# Patient Record
Sex: MALE | Race: WHITE | Employment: FULL TIME | URBAN - METROPOLITAN AREA
[De-identification: names, ages, dates, MRNs, and addresses within clinical notes are randomized per-mention and may not be internally consistent; named-entity substitution may affect disease eponyms.]

---

## 2017-05-02 ENCOUNTER — TRANSFERRED RECORDS (OUTPATIENT)
Dept: HEALTH INFORMATION MANAGEMENT | Facility: CLINIC | Age: 37
End: 2017-05-02

## 2018-01-08 ENCOUNTER — TRANSFERRED RECORDS (OUTPATIENT)
Dept: HEALTH INFORMATION MANAGEMENT | Facility: CLINIC | Age: 38
End: 2018-01-08

## 2018-04-12 ENCOUNTER — TRANSFERRED RECORDS (OUTPATIENT)
Dept: HEALTH INFORMATION MANAGEMENT | Facility: CLINIC | Age: 38
End: 2018-04-12

## 2018-04-23 ENCOUNTER — TELEPHONE (OUTPATIENT)
Dept: OTHER | Facility: CLINIC | Age: 38
End: 2018-04-23

## 2018-04-23 NOTE — TELEPHONE ENCOUNTER
Referral received via fax.     Pt referred to [facility] by Dr. Jacinto of [facility] for lower leg pain and possible popliteal entrapment syndrome.     Pt had MRI and compartment testing with [facility].     Pt needs to be scheduled for consult with vascular surgery (referring provider requesting Dr. العلي).  Will route to scheduling to coordinate an appointment at next available.     ASHLEE ReynosoN, RN

## 2018-04-24 NOTE — TELEPHONE ENCOUNTER
Left message on home number for patient to call back to schedule consult appointment with Dr. العلي.

## 2018-05-10 NOTE — TELEPHONE ENCOUNTER
Progress notes and compartment testing, MRI results received from [facility] and in nurses office.     Images of MRI in [facility] site.     Lory Stewart, ASHLEEN, RN

## 2018-05-10 NOTE — TELEPHONE ENCOUNTER
Lb called back to schedule appointment with Dr. العلي.  He is scheduled on 05/17/18 with Dr. العلي.  Previous nurse note states patient had compartment testing and MRI at [facility].  I will route to nurse to see if she has notes/reports/images or if I need to request from [facility] for upcoming appt. Trice Villa,

## 2018-05-17 ENCOUNTER — OFFICE VISIT (OUTPATIENT)
Dept: OTHER | Facility: CLINIC | Age: 38
End: 2018-05-17
Attending: SURGERY
Payer: OTHER GOVERNMENT

## 2018-05-17 VITALS
BODY MASS INDEX: 29.82 KG/M2 | HEART RATE: 112 BPM | HEIGHT: 73 IN | WEIGHT: 225 LBS | DIASTOLIC BLOOD PRESSURE: 79 MMHG | SYSTOLIC BLOOD PRESSURE: 117 MMHG

## 2018-05-17 DIAGNOSIS — I77.89 POPLITEAL ARTERY ENTRAPMENT SYNDROME (H): Primary | ICD-10-CM

## 2018-05-17 PROCEDURE — 99203 OFFICE O/P NEW LOW 30 MIN: CPT | Mod: ZP | Performed by: SURGERY

## 2018-05-17 PROCEDURE — G0463 HOSPITAL OUTPT CLINIC VISIT: HCPCS

## 2018-05-17 NOTE — MR AVS SNAPSHOT
"              After Visit Summary   5/17/2018    Lb Ornelas    MRN: [id number]           Patient Information     Date Of Birth          1980        Visit Information        Provider Department      5/17/2018 2:00 PM Gordo العلي MD [facility]      Today's Diagnoses     Popliteal artery entrapment syndrome (H)    -  1       Follow-ups after your visit        Future tests that were ordered for you today     Open Future Orders        Priority Expected Expires Ordered    US DAVION Doppler with Exercise Bilateral Routine 5/17/2018 5/17/2019 5/17/2018    US Lower Extremity Arterial Duplex Bilateral Routine 5/17/2018 5/17/2019 5/17/2018    US Lower Extremity Venous Duplex Bilateral Routine 5/17/2018 5/17/2019 5/17/2018            Who to contact     If you have questions or need follow up information about today's clinic visit or your schedule please contact [facility] directly at [phone].  Normal or non-critical lab and imaging results will be communicated to you by [employer], letter or phone within 4 business days after the clinic has received the results. If you do not hear from us within 7 days, please contact the clinic through [employer] or phone. If you have a critical or abnormal lab result, we will notify you by phone as soon as possible.  Submit refill requests through [employer] or call your pharmacy and they will forward the refill request to us. Please allow 3 business days for your refill to be completed.          Additional Information About Your Visit        MyChart Information     [employer] lets you send messages to your doctor, view your test results, renew your prescriptions, schedule appointments and more. To sign up, go to [email or web address] . Click on \"Log in\" on the left side of the screen, which will take you to the Welcome page. Then click on \"Sign up Now\" on the right side of the page.     You " "will be asked to enter the access code listed below, as well as some personal information. Please follow the directions to create your username and password.     Your access code is: [email or web address]  Expires: 8/15/2018  2:42 PM     Your access code will  in 90 days. If you need help or a new code, please call your [place] clinic or [phone].        Care EveryWhere ID     This is your Care EveryWhere ID. This could be used by other organizations to access your [place] medical records  [id number]        Your Vitals Were     Pulse Height BMI (Body Mass Index)             112 6' 1\" (1.854 m) 29.69 kg/m2          Blood Pressure from Last 3 Encounters:   18 117/79    Weight from Last 3 Encounters:   18 225 lb (102.1 kg)              Today, you had the following     No orders found for display       Primary Care Provider Office Phone # Fax #    Venessa Zarate [phone]       [facility] [address] [facility] [address]        Equal Access to Services     [facility]: Hadii aad ku hadasho Soedgarali, waaxda luqadaha, qaybta kaalmada adebert, thanh quintero . So [facility] [phone].    ATENCIÓN: Si habla español, tiene a anderson disposición servicios gratuitos de asistencia lingüística. Brenda al [phone].    We comply with applicable federal civil rights laws and [place] laws. We do not discriminate on the basis of race, color, national origin, age, disability, sex, sexual orientation, or gender identity.            Thank you!     Thank you for choosing [facility] [place]  for your care. Our goal is always to provide you with excellent care. Hearing back from our patients is one way we can continue to improve our services. Please take a few minutes to complete the written survey that you may receive in the mail after your visit with us. Thank you!             Your Updated Medication List - Protect others around you: " Learn how to safely use, store and throw away your medicines at www.disposemymeds.org.      Notice  As of 5/17/2018  2:42 PM    You have not been prescribed any medications.

## 2018-05-17 NOTE — PROGRESS NOTES
[place] [facility]    Lb Ornelas to see me today per recommendation of Dr. Carver from [facility] and Dr. Jacinto.  This 37-year-old patient has complained of left greater than right pain associated with activities of began in 2015 not associated with any specific trauma.      Prior evaluation on 5/2/2017 with MRI healed no stress fracture or other pathology.    Underwent compartment pressures on 1/8/2018.  The right anterior compartment was 42 mmHg, left anterior compartment 64 mmHg, left deep posterior compartment 25 mmHg.  Dr. Carver did not feel that he had exercise-induced compartment syndrome.  He did not recommend surgical treatment with fasciotomies.    Patient is in [employer] and has not been able to run any long distances due to his leg discomfort.  He notices this usually between three-quarter and 1 mile distance.  His leg will be sore following this may last for several days.  Both legs are affected equally.  He reports the pain is more prominent of the distal anterior compartment some tingling and numbness at that area bilaterally.  He also experienced tightness of the posterior calf bilaterally.      He did have a stress fracture documented by MRI in 2015 that healed with time and cessation of running.      PMH: Medications: None.            No underlying medical problems.            No major surgical procedures.      He did smoke 1 cigarette daily for up to 10 years but quit in 2016.  Problems with alcohol use.    Cristopher: Very pleasant alert gentleman.  Blood pressure 146/94 right arm and 117/79 left arm.  Pulse between 89 and 112.  Normal affect.  BMI 29.7 kg/m .      Weight = 102.1 kg   Chest= clear.  Cardiovascular=RR  No lower leg swelling.  Normal sensation.  +3 posterior tibial pulses bilaterally  No calf tenderness.      .  Impression: Bilateral leg discomfort associated with running.  No evidence of the recent stress fracture that he had many years ago.  This certainly could be a  variant of popliteal artery compression syndrome.  I discussed the various muscles that can cause this problem including the medial head of the gastrocnemius muscle-plantaris muscle/tendon-soleus fascial band.  The numbness and tingling over the anterior distal compartment does not quite fit into this picture since this peroneal nerve has a very high takeoff and usually is not involved with any extrinsic compression.                             We will try to evaluate this further with popliteal entrapment including DAVION with exercise and dynamic duplex ultrasound of the popliteal arteries and veins to see if there is any signs of extrinsic compression.  I spent 20 minutes with the patient today with over 50% in counseling.  We will follow-up with him once the testing has been performed.      Gordo العلي MD     Please route or send letter to:  Dr Brandon Jacinto @ [facility]

## 2018-05-17 NOTE — NURSING NOTE
"Lb Ornelas is a 37 year old male who presents for:  Chief Complaint   Patient presents with     Consult     Pt referred to [facility] by Dr. Jacinto of [facility] for lower leg pain and possible popliteal entrapment syndrome. Pt had compartment testing and MRI at [facility].        Vitals:    Vitals:    05/17/18 1356 05/17/18 1357   BP: (!) 146/94 117/79   BP Location: Right arm Left arm   Patient Position: Chair Chair   Cuff Size: Adult Large Adult Large   Pulse: 89 112   Weight: 225 lb (102.1 kg)    Height: 6' 1\" (1.854 m)        BMI:  Estimated body mass index is 29.69 kg/(m^2) as calculated from the following:    Height as of this encounter: 6' 1\" (1.854 m).    Weight as of this encounter: 225 lb (102.1 kg).    Pain Score:  Data Unavailable            Madhuri Metz"

## 2018-05-17 NOTE — LETTER
[facility] [place] [address]  [place] [address]  Phone: [phone]  Fax: [phone]    May 17, 2018    Re: Lb Ornelas - 1980    Lb Ornelas to see me today per recommendation of Dr. Carver from [facility] and Dr. Jacinto. This 37-year-old patient has complained of left greater than right pain associated with activities of began in  not associated with any specific trauma.       Prior evaluation on 2017 with MRI healed no stress fracture or other pathology.     Underwent compartment pressures on 2018.  The right anterior compartment was 42 mmHg, left anterior compartment 64 mmHg, left deep posterior compartment 25 mmHg.  Dr. Carver did not feel that he had exercise-induced compartment syndrome.  He did not recommend surgical treatment with fasciotomies.     Patient is in [employer] and has not been able to run any long distances due to his leg discomfort.  He notices this usually between three-quarter and 1 mile distance.  His leg will be sore following this may last for several days.  Both legs are affected equally.  He reports the pain is more prominent of the distal anterior compartment some tingling and numbness at that area bilaterally.  He also experienced tightness of the posterior calf bilaterally.     He did have a stress fracture documented by MRI in  that healed with time and cessation of running.     PMH: Medications: None.            No underlying medical problems.            No major surgical procedures.     He did smoke 1 cigarette daily for up to 10 years but quit in .  Problems with alcohol use.     Cristopher: Very pleasant alert gentleman.  Blood pressure 146/94 right arm and 117/79 left arm. Pulse between 89 and 112.  Normal affect.  BMI 29.7 kg/m .      Weight = 102.1 kg   Chest= clear.  Cardiovascular=RR  No lower leg swelling.  Normal sensation.  +3 posterior tibial pulses bilaterally  No calf tenderness.     Impression:  Bilateral leg discomfort associated with running.  No evidence of the recent stress fracture that he had many years ago.  This certainly could be a variant of popliteal artery compression syndrome. I discussed the various muscles that can cause this problem including the medial head of the gastrocnemius muscle-plantaris muscle/tendon-soleus fascial band. The numbness and tingling over the anterior distal compartment does not quite fit into this picture since this peroneal nerve has a very high takeoff and usually is not involved with any extrinsic compression.     We will try to evaluate this further with popliteal entrapment including DAVION with exercise and dynamic duplex ultrasound of the popliteal arteries and veins to see if there is any signs of extrinsic compression. We will follow-up with him once the testing has been performed.        Gordo العلي MD

## 2018-05-30 ENCOUNTER — HOSPITAL ENCOUNTER (OUTPATIENT)
Dept: ULTRASOUND IMAGING | Facility: CLINIC | Age: 38
End: 2018-05-30
Attending: SURGERY
Payer: OTHER GOVERNMENT

## 2018-05-30 ENCOUNTER — HOSPITAL ENCOUNTER (OUTPATIENT)
Dept: ULTRASOUND IMAGING | Facility: CLINIC | Age: 38
Discharge: HOME | End: 2018-05-30
Attending: SURGERY | Admitting: SURGERY
Payer: OTHER GOVERNMENT

## 2018-05-30 DIAGNOSIS — I77.89 POPLITEAL ARTERY ENTRAPMENT SYNDROME (H): ICD-10-CM

## 2018-05-30 PROCEDURE — 93924 LWR XTR VASC STDY BILAT: CPT

## 2018-05-30 PROCEDURE — 93970 EXTREMITY STUDY: CPT

## 2018-05-30 PROCEDURE — 93925 LOWER EXTREMITY STUDY: CPT

## 2018-06-01 ENCOUNTER — TELEPHONE (OUTPATIENT)
Dept: OTHER | Facility: CLINIC | Age: 38
End: 2018-06-01

## 2018-06-01 DIAGNOSIS — I77.89 POPLITEAL ARTERY ENTRAPMENT SYNDROME (H): Primary | ICD-10-CM

## 2018-06-01 NOTE — TELEPHONE ENCOUNTER
[place] [facility]    I called Lb Ornelas about his noninvasive testing.  He has bilateral pain and tingling in his calf and feet associated with running.  This has not improved with conservative treatment.  He is in the [employer] and this prevents him from doing many of his training exercises.  His clinical history was consistent with popliteal artery entrapment syndrome.      We performed an DAVION with exercise.  This was 1.11 at rest in both of his legs with triphasic waveforms.  With running he developed tingling and pain with a decrease in the DAVION on the right to 0.83 in the left to 0.80 which is hemodynamically significant.  On his dynamic arterial duplex    The above and mid popliteal arteries were normal in diameter with very minimal compression.  However, it was a significant decrease with plantarflexion of the distal popliteal artery at the level of the  soleus fascia.  The right popliteal artery went from 6.8 mm of breast down to 2.2 mm with a marked increase in the velocities return is normal with dorsiflexion.  On the left this went from 7.1-2.4 mm again with an elevated velocity and returning to normal with dorsiflexion.          Venous exam revealed a smaller distal popliteal vein that showed evidence of compression at the soleus fascial level of the right and at the plantaris level on the left.        We discussed these findings and the phone with her 15 minute conversation today.  This testing would be indicative of compression of the neurovascular structures including the posterior tibial nerve by the plantaris muscle and also by the soleus proximal fascial band.  With the positive testing I would suspect that he should improve following the surgery though as we discussed at length today this is not certain.  I would recommend he undergo bilateral excision of the plantaris muscle/tendon and opening up of the proximal soleus fascia.  He is aware that this could cause some numbness  along the greater saphenous cutaneous nerve distribution due to traction though usually can avoid this nerve.  There also is some chance of scar tissue from the divided soleus fascia and we discussed the ways we help prevent this.  There is no specific restrictions to his activities following surgery though he would wear compression for a period time.  His activities would be limited by the discomfort only.  This would be performed under general anesthetic with usually a 1 day stay in the hospital.    I answered all of Lb's questions today.  He is interested in proceeding [facility] discectomy surgery.  He will have to work around his commitments this summer with his [employer] training commitments.        Gordo العلي MD

## 2018-06-01 NOTE — TELEPHONE ENCOUNTER
PRINCE Asher asking him to call me when he is ready to schedule the right and left plantaris muscle/tendon and soleus release when he is ready. Trice Villa,

## 2018-06-05 NOTE — TELEPHONE ENCOUNTER
L/m on Lb's cell that Dr العلي has opening midday this Fri 6/8/18, call back if interested. Jody Candelario -  at [facility]

## 2018-06-14 ENCOUNTER — TELEPHONE (OUTPATIENT)
Dept: OTHER | Facility: CLINIC | Age: 38
End: 2018-06-14

## 2018-06-14 NOTE — TELEPHONE ENCOUNTER
Lb called late yesterday to schedule surgery.  Type of surgery: RIGHT AND LEFT PLANTARIS MUSCLE, TENDON AND SOLEUS RELEASE  Location of surgery: [facility]  Date and time of surgery: 07/13/18 @ 7:30am  Surgeon: DR. SKY DENISE  Pre-Op Appt Date: PT TO SCHEDULE  Post-Op Appt Date: PT TO SCHEDULE   Packet sent out: Yes  Pre-cert/Authorization completed:  Yes  Date: 06/14/18

## 2018-07-12 ENCOUNTER — ANESTHESIA EVENT (OUTPATIENT)
Dept: SURGERY | Facility: CLINIC | Age: 38
End: 2018-07-12
Payer: OTHER GOVERNMENT

## 2018-07-12 ENCOUNTER — TELEPHONE (OUTPATIENT)
Dept: OTHER | Facility: CLINIC | Age: 38
End: 2018-07-12

## 2018-07-13 ENCOUNTER — ANESTHESIA (OUTPATIENT)
Dept: SURGERY | Facility: CLINIC | Age: 38
End: 2018-07-13
Payer: OTHER GOVERNMENT

## 2018-07-13 ENCOUNTER — OFFICE VISIT (OUTPATIENT)
Dept: SURGERY | Facility: PHYSICIAN GROUP | Age: 38
End: 2018-07-13
Payer: OTHER GOVERNMENT

## 2018-07-13 ENCOUNTER — HOSPITAL ENCOUNTER (OUTPATIENT)
Facility: CLINIC | Age: 38
Discharge: HOME | End: 2018-07-13
Attending: SURGERY | Admitting: SURGERY
Payer: OTHER GOVERNMENT

## 2018-07-13 ENCOUNTER — SURGERY (OUTPATIENT)
Age: 38
End: 2018-07-13

## 2018-07-13 VITALS
TEMPERATURE: 97.5 F | OXYGEN SATURATION: 94 % | SYSTOLIC BLOOD PRESSURE: 134 MMHG | HEIGHT: 73 IN | DIASTOLIC BLOOD PRESSURE: 81 MMHG | RESPIRATION RATE: 16 BRPM | WEIGHT: 230.5 LBS | BODY MASS INDEX: 30.55 KG/M2

## 2018-07-13 DIAGNOSIS — I77.89 POPLITEAL ARTERY ENTRAPMENT SYNDROME (H): Primary | ICD-10-CM

## 2018-07-13 LAB
ANION GAP SERPL CALCULATED.3IONS-SCNC: 9 MMOL/L (ref 3–14)
BUN SERPL-MCNC: 14 MG/DL (ref 7–30)
CALCIUM SERPL-MCNC: 8.9 MG/DL (ref 8.5–10.1)
CHLORIDE SERPL-SCNC: 108 MMOL/L (ref 94–109)
CO2 SERPL-SCNC: 24 MMOL/L (ref 20–32)
CREAT SERPL-MCNC: 1.26 MG/DL (ref 0.66–1.25)
GFR SERPL CREATININE-BSD FRML MDRD: 65 ML/MIN/1.7M2
GLUCOSE SERPL-MCNC: 96 MG/DL (ref 70–99)
POTASSIUM SERPL-SCNC: 3.8 MMOL/L (ref 3.4–5.3)
SODIUM SERPL-SCNC: 141 MMOL/L (ref 133–144)

## 2018-07-13 PROCEDURE — 25000128 H RX IP 250 OP 636: Performed by: SURGERY

## 2018-07-13 PROCEDURE — 37000008 ZZH ANESTHESIA TECHNICAL FEE, 1ST 30 MIN: Performed by: SURGERY

## 2018-07-13 PROCEDURE — 36000063 ZZH SURGERY LEVEL 4 EA 15 ADDTL MIN: Performed by: SURGERY

## 2018-07-13 PROCEDURE — 25000125 ZZHC RX 250: Performed by: NURSE ANESTHETIST, CERTIFIED REGISTERED

## 2018-07-13 PROCEDURE — 71000012 ZZH RECOVERY PHASE 1 LEVEL 1 FIRST HR: Performed by: SURGERY

## 2018-07-13 PROCEDURE — 80048 BASIC METABOLIC PNL TOTAL CA: CPT | Performed by: SURGERY

## 2018-07-13 PROCEDURE — 25000132 ZZH RX MED GY IP 250 OP 250 PS 637: Performed by: SURGERY

## 2018-07-13 PROCEDURE — 27210995 ZZH RX 272: Performed by: SURGERY

## 2018-07-13 PROCEDURE — 40000170 ZZH STATISTIC PRE-PROCEDURE ASSESSMENT II: Performed by: SURGERY

## 2018-07-13 PROCEDURE — 25000566 ZZH SEVOFLURANE, EA 15 MIN: Performed by: SURGERY

## 2018-07-13 PROCEDURE — 71000027 ZZH RECOVERY PHASE 2 EACH 15 MINS: Performed by: SURGERY

## 2018-07-13 PROCEDURE — 27687 REVISION OF CALF TENDON: CPT | Mod: 50 | Performed by: SURGERY

## 2018-07-13 PROCEDURE — 37000009 ZZH ANESTHESIA TECHNICAL FEE, EACH ADDTL 15 MIN: Performed by: SURGERY

## 2018-07-13 PROCEDURE — 27210794 ZZH OR GENERAL SUPPLY STERILE: Performed by: SURGERY

## 2018-07-13 PROCEDURE — 25000128 H RX IP 250 OP 636: Performed by: ANESTHESIOLOGY

## 2018-07-13 PROCEDURE — 25000125 ZZHC RX 250: Performed by: SURGERY

## 2018-07-13 PROCEDURE — 25000128 H RX IP 250 OP 636: Performed by: NURSE ANESTHETIST, CERTIFIED REGISTERED

## 2018-07-13 PROCEDURE — C1765 ADHESION BARRIER: HCPCS | Performed by: SURGERY

## 2018-07-13 PROCEDURE — 36000093 ZZH SURGERY LEVEL 4 1ST 30 MIN: Performed by: SURGERY

## 2018-07-13 PROCEDURE — 36415 COLL VENOUS BLD VENIPUNCTURE: CPT | Performed by: SURGERY

## 2018-07-13 RX ORDER — ONDANSETRON 2 MG/ML
4 INJECTION INTRAMUSCULAR; INTRAVENOUS EVERY 30 MIN PRN
Status: DISCONTINUED | OUTPATIENT
Start: 2018-07-13 | End: 2018-07-13 | Stop reason: HOSPADM

## 2018-07-13 RX ORDER — GLYCOPYRROLATE 0.2 MG/ML
INJECTION, SOLUTION INTRAMUSCULAR; INTRAVENOUS PRN
Status: DISCONTINUED | OUTPATIENT
Start: 2018-07-13 | End: 2018-07-13

## 2018-07-13 RX ORDER — OXYCODONE AND ACETAMINOPHEN 5; 325 MG/1; MG/1
1-2 TABLET ORAL EVERY 4 HOURS PRN
Qty: 15 TABLET | Refills: 0 | Status: ON HOLD | OUTPATIENT
Start: 2018-07-13 | End: 2019-02-15

## 2018-07-13 RX ORDER — BUPIVACAINE HYDROCHLORIDE 5 MG/ML
INJECTION, SOLUTION PERINEURAL PRN
Status: DISCONTINUED | OUTPATIENT
Start: 2018-07-13 | End: 2018-07-13 | Stop reason: HOSPADM

## 2018-07-13 RX ORDER — DEXAMETHASONE SODIUM PHOSPHATE 4 MG/ML
INJECTION, SOLUTION INTRA-ARTICULAR; INTRALESIONAL; INTRAMUSCULAR; INTRAVENOUS; SOFT TISSUE PRN
Status: DISCONTINUED | OUTPATIENT
Start: 2018-07-13 | End: 2018-07-13

## 2018-07-13 RX ORDER — LIDOCAINE HYDROCHLORIDE 20 MG/ML
INJECTION, SOLUTION INFILTRATION; PERINEURAL PRN
Status: DISCONTINUED | OUTPATIENT
Start: 2018-07-13 | End: 2018-07-13

## 2018-07-13 RX ORDER — KETOROLAC TROMETHAMINE 30 MG/ML
INJECTION, SOLUTION INTRAMUSCULAR; INTRAVENOUS PRN
Status: DISCONTINUED | OUTPATIENT
Start: 2018-07-13 | End: 2018-07-13

## 2018-07-13 RX ORDER — ONDANSETRON 2 MG/ML
INJECTION INTRAMUSCULAR; INTRAVENOUS PRN
Status: DISCONTINUED | OUTPATIENT
Start: 2018-07-13 | End: 2018-07-13

## 2018-07-13 RX ORDER — FENTANYL CITRATE 50 UG/ML
25-50 INJECTION, SOLUTION INTRAMUSCULAR; INTRAVENOUS
Status: DISCONTINUED | OUTPATIENT
Start: 2018-07-13 | End: 2018-07-13 | Stop reason: HOSPADM

## 2018-07-13 RX ORDER — CEFAZOLIN SODIUM 2 G/100ML
2 INJECTION, SOLUTION INTRAVENOUS
Status: COMPLETED | OUTPATIENT
Start: 2018-07-13 | End: 2018-07-13

## 2018-07-13 RX ORDER — PROPOFOL 10 MG/ML
INJECTION, EMULSION INTRAVENOUS PRN
Status: DISCONTINUED | OUTPATIENT
Start: 2018-07-13 | End: 2018-07-13

## 2018-07-13 RX ORDER — OXYCODONE AND ACETAMINOPHEN 5; 325 MG/1; MG/1
1 TABLET ORAL
Status: COMPLETED | OUTPATIENT
Start: 2018-07-13 | End: 2018-07-13

## 2018-07-13 RX ORDER — FENTANYL CITRATE 50 UG/ML
50-100 INJECTION, SOLUTION INTRAMUSCULAR; INTRAVENOUS
Status: DISCONTINUED | OUTPATIENT
Start: 2018-07-13 | End: 2018-07-13 | Stop reason: HOSPADM

## 2018-07-13 RX ORDER — HYDROMORPHONE HYDROCHLORIDE 1 MG/ML
.3-.5 INJECTION, SOLUTION INTRAMUSCULAR; INTRAVENOUS; SUBCUTANEOUS EVERY 10 MIN PRN
Status: DISCONTINUED | OUTPATIENT
Start: 2018-07-13 | End: 2018-07-13 | Stop reason: HOSPADM

## 2018-07-13 RX ORDER — MAGNESIUM HYDROXIDE 1200 MG/15ML
LIQUID ORAL PRN
Status: DISCONTINUED | OUTPATIENT
Start: 2018-07-13 | End: 2018-07-13 | Stop reason: HOSPADM

## 2018-07-13 RX ORDER — FENTANYL CITRATE 50 UG/ML
25-50 INJECTION, SOLUTION INTRAMUSCULAR; INTRAVENOUS EVERY 5 MIN PRN
Status: DISCONTINUED | OUTPATIENT
Start: 2018-07-13 | End: 2018-07-13 | Stop reason: HOSPADM

## 2018-07-13 RX ORDER — SODIUM CHLORIDE, SODIUM LACTATE, POTASSIUM CHLORIDE, CALCIUM CHLORIDE 600; 310; 30; 20 MG/100ML; MG/100ML; MG/100ML; MG/100ML
INJECTION, SOLUTION INTRAVENOUS CONTINUOUS
Status: DISCONTINUED | OUTPATIENT
Start: 2018-07-13 | End: 2018-07-13 | Stop reason: HOSPADM

## 2018-07-13 RX ORDER — FENTANYL CITRATE 50 UG/ML
INJECTION, SOLUTION INTRAMUSCULAR; INTRAVENOUS PRN
Status: DISCONTINUED | OUTPATIENT
Start: 2018-07-13 | End: 2018-07-13

## 2018-07-13 RX ORDER — SODIUM CHLORIDE, SODIUM LACTATE, POTASSIUM CHLORIDE, CALCIUM CHLORIDE 600; 310; 30; 20 MG/100ML; MG/100ML; MG/100ML; MG/100ML
INJECTION, SOLUTION INTRAVENOUS CONTINUOUS PRN
Status: DISCONTINUED | OUTPATIENT
Start: 2018-07-13 | End: 2018-07-13

## 2018-07-13 RX ORDER — ACETAMINOPHEN 500 MG
1000 TABLET ORAL ONCE
Status: COMPLETED | OUTPATIENT
Start: 2018-07-13 | End: 2018-07-13

## 2018-07-13 RX ORDER — MEPERIDINE HYDROCHLORIDE 25 MG/ML
12.5 INJECTION INTRAMUSCULAR; INTRAVENOUS; SUBCUTANEOUS
Status: DISCONTINUED | OUTPATIENT
Start: 2018-07-13 | End: 2018-07-13 | Stop reason: HOSPADM

## 2018-07-13 RX ORDER — ONDANSETRON 4 MG/1
4 TABLET, ORALLY DISINTEGRATING ORAL EVERY 30 MIN PRN
Status: DISCONTINUED | OUTPATIENT
Start: 2018-07-13 | End: 2018-07-13 | Stop reason: HOSPADM

## 2018-07-13 RX ORDER — NALOXONE HYDROCHLORIDE 0.4 MG/ML
.1-.4 INJECTION, SOLUTION INTRAMUSCULAR; INTRAVENOUS; SUBCUTANEOUS
Status: DISCONTINUED | OUTPATIENT
Start: 2018-07-13 | End: 2018-07-13 | Stop reason: HOSPADM

## 2018-07-13 RX ADMIN — ACETAMINOPHEN 1000 MG: 500 TABLET, FILM COATED ORAL at 07:04

## 2018-07-13 RX ADMIN — FENTANYL CITRATE 25 MCG: 50 INJECTION, SOLUTION INTRAMUSCULAR; INTRAVENOUS at 08:16

## 2018-07-13 RX ADMIN — CEFAZOLIN SODIUM 2 G: 2 INJECTION, SOLUTION INTRAVENOUS at 07:37

## 2018-07-13 RX ADMIN — SODIUM CHLORIDE, POTASSIUM CHLORIDE, SODIUM LACTATE AND CALCIUM CHLORIDE: 600; 310; 30; 20 INJECTION, SOLUTION INTRAVENOUS at 07:25

## 2018-07-13 RX ADMIN — FENTANYL CITRATE 25 MCG: 50 INJECTION, SOLUTION INTRAMUSCULAR; INTRAVENOUS at 09:12

## 2018-07-13 RX ADMIN — GLYCOPYRROLATE 0.2 MG: 0.2 INJECTION, SOLUTION INTRAMUSCULAR; INTRAVENOUS at 07:56

## 2018-07-13 RX ADMIN — FENTANYL CITRATE 50 MCG: 50 INJECTION, SOLUTION INTRAMUSCULAR; INTRAVENOUS at 07:48

## 2018-07-13 RX ADMIN — FENTANYL CITRATE 25 MCG: 50 INJECTION, SOLUTION INTRAMUSCULAR; INTRAVENOUS at 09:03

## 2018-07-13 RX ADMIN — FENTANYL CITRATE 25 MCG: 50 INJECTION, SOLUTION INTRAMUSCULAR; INTRAVENOUS at 08:37

## 2018-07-13 RX ADMIN — FENTANYL CITRATE 50 MCG: 50 INJECTION INTRAMUSCULAR; INTRAVENOUS at 09:22

## 2018-07-13 RX ADMIN — PROPOFOL 260 MG: 10 INJECTION, EMULSION INTRAVENOUS at 07:30

## 2018-07-13 RX ADMIN — FENTANYL CITRATE 100 MCG: 50 INJECTION, SOLUTION INTRAMUSCULAR; INTRAVENOUS at 07:30

## 2018-07-13 RX ADMIN — DEXAMETHASONE SODIUM PHOSPHATE 4 MG: 4 INJECTION, SOLUTION INTRA-ARTICULAR; INTRALESIONAL; INTRAMUSCULAR; INTRAVENOUS; SOFT TISSUE at 07:39

## 2018-07-13 RX ADMIN — SODIUM CHLORIDE 1000 ML: 900 IRRIGANT IRRIGATION at 08:04

## 2018-07-13 RX ADMIN — MIDAZOLAM 2 MG: 1 INJECTION INTRAMUSCULAR; INTRAVENOUS at 07:27

## 2018-07-13 RX ADMIN — OXYCODONE HYDROCHLORIDE AND ACETAMINOPHEN 1 TABLET: 5; 325 TABLET ORAL at 09:53

## 2018-07-13 RX ADMIN — KETOROLAC TROMETHAMINE 30 MG: 30 INJECTION, SOLUTION INTRAMUSCULAR at 08:55

## 2018-07-13 RX ADMIN — BUPIVACAINE HYDROCHLORIDE 30 ML: 5 INJECTION, SOLUTION PERINEURAL at 08:57

## 2018-07-13 RX ADMIN — ONDANSETRON 4 MG: 2 INJECTION INTRAMUSCULAR; INTRAVENOUS at 08:55

## 2018-07-13 RX ADMIN — LIDOCAINE HYDROCHLORIDE 100 MG: 20 INJECTION, SOLUTION INFILTRATION; PERINEURAL at 07:30

## 2018-07-13 ASSESSMENT — COPD QUESTIONNAIRES: COPD: 0

## 2018-07-13 ASSESSMENT — ENCOUNTER SYMPTOMS
ORTHOPNEA: 0
DYSRHYTHMIAS: 0
SEIZURES: 0

## 2018-07-13 ASSESSMENT — LIFESTYLE VARIABLES: TOBACCO_USE: 0

## 2018-07-13 NOTE — BRIEF OP NOTE
[facility] Brief Operative Note    Pre-operative diagnosis: POPLITEAL ENTRAPMENT SYNDROME   Post-operative diagnosis Same as above   Procedure: Procedure(s):  RIGHT AND LEFT PLANTARIS MUSCLE, TENDON, AND SOLEUS RELEASE - Wound Class: I-Clean   Surgeon(s): Surgeon(s) and Role:     * Gordo العلي MD - Primary     * Dinh Castrejon MD - Assisting   Estimated blood loss: 2 mL    Specimens: * No specimens in log *   Findings: Plantaris muscle division and soleus fascia release done bilaterally until the popliteal artery, tibioperoneal trunk was confirmed to be freely coursing down the deep posterior compartment.     Aubrie Washburn DO PGY 4  Department of Surgery

## 2018-07-13 NOTE — ANESTHESIA CARE TRANSFER NOTE
Patient: Lb Ornelas    Procedure(s):  RIGHT AND LEFT PLANTARIS MUSCLE, TENDON, AND SOLEUS RELEASE - Wound Class: I-Clean    Diagnosis: POPLITEAL ENTRAPMENT SYNDROME  Diagnosis Additional Information: No value filed.    Anesthesia Type:   General, LMA     Note:  Airway :Face Mask  Patient transferred to:PACU  Handoff Report: Identifed the Patient, Identified the Reponsible Provider, Reviewed the pertinent medical history, Discussed the surgical course, Reviewed Intra-OP anesthesia mangement and issues during anesthesia, Set expectations for post-procedure period and Allowed opportunity for questions and acknowledgement of understanding      Vitals: (Last set prior to Anesthesia Care Transfer)    CRNA VITALS  7/13/2018 0844 - 7/13/2018 0919      7/13/2018             Pulse: 111    SpO2: 99 %    Resp Rate (set): 10                Electronically Signed By: Naldo Ramirez  July 13, 2018  9:19 AM

## 2018-07-13 NOTE — ANESTHESIA PREPROCEDURE EVALUATION
Procedure: Procedure(s):  RELEASE POPLITEAL ENTRAPMENT  Preop diagnosis: POPLITEAL ENTRAPMENT SYNDROME    Allergies   Allergen Reactions     No Known Allergies      Past Medical History:   Diagnosis Date     Popliteal artery entrapment syndrome (H)      Stress fracture     HX of bilat knees     Past Surgical History:   Procedure Laterality Date     wisdom teeth       Social History   Substance Use Topics     Smoking status: Former Smoker     Smokeless tobacco: Current User     Alcohol use Yes     Prior to Admission medications    Not on File     No current Epic-ordered facility-administered medications on file.      No current Epic-ordered outpatient prescriptions on file.       Wt Readings from Last 1 Encounters:   05/17/18 102.1 kg (225 lb)     Temp Readings from Last 1 Encounters:   No data found for Temp     BP Readings from Last 6 Encounters:   05/17/18 117/79     Pulse Readings from Last 4 Encounters:   05/17/18 112     Resp Readings from Last 1 Encounters:   No data found for Resp     SpO2 Readings from Last 1 Encounters:   No data found for SpO2     RECENT LABS:   HGB, Plts WNL     Anesthesia Evaluation     . Pt has had prior anesthetic. Type: MAC    No history of anesthetic complications          ROS/MED HX    ENT/Pulmonary:      (-) tobacco use, asthma, COPD, sleep apnea and recent URI   Neurologic:      (-) seizures, CVA and TIA   Cardiovascular: Comment: Popliteal Artery Entrapment Syndrome        (-) angina, hypertension, CAD, orthopnea/PND, syncope, arrhythmias, irregular heartbeat/palpitations, valvular problems/murmurs and angina   METS/Exercise Tolerance:  >4 METS   Hematologic:        (-) anemia   Musculoskeletal:         GI/Hepatic:     (+) GERD (occasional - none currently) Other,      (-) liver disease   Renal/Genitourinary:      (-) renal disease   Endo:      (-) Type II DM, thyroid disease and chronic steroid usage   Psychiatric:         Infectious Disease:        (-) Recent Fever    Malignancy:         Other:                     Physical Exam  Normal systems: dental    Airway   Mallampati: II  TM distance: >3 FB  Neck ROM: full    Dental     Cardiovascular   Rhythm and rate: regular and normal  (-) no murmur    Pulmonary    breath sounds clear to auscultation(-) no rhonchi and no wheezes                    Anesthesia Plan      History & Physical Review  History and physical reviewed and following examination; no interval change.    ASA Status:  1 .    NPO Status:  > 8 hours    Plan for General and LMA with Intravenous and Propofol induction. Maintenance will be Balanced.    PONV prophylaxis:  Ondansetron (or other 5HT-3) and Dexamethasone or Solumedrol       Postoperative Care  Postoperative pain management:  Multi-modal analgesia.      Consents  Anesthetic plan, risks, benefits and alternatives discussed with:  Patient..

## 2018-07-13 NOTE — PROGRESS NOTES
Admission medication history interview status for the 7/13/2018  admission is complete. See EPIC admission navigator for prior to admission medications     Medication history source reliability:Good    Medication history interview source(s):Patient    Medication history resources (including written lists, pill bottles, clinic record):None    Primary pharmacy.N/A    Additional medication history information not noted on PTA med list :None    Time spent in this activity: 30 minutes    Prior to Admission medications    Not on File

## 2018-07-13 NOTE — IP AVS SNAPSHOT
[place] [address]    [place] [address]    Phone:  [phone]                                       After Visit Summary   7/13/2018    Lb Ornelas    MRN: [id number]           After Visit Summary Signature Page     I have received my discharge instructions, and my questions have been answered. I have discussed any challenges I see with this plan with the nurse or doctor.    ..........................................................................................................................................  Patient/Patient Representative Signature      ..........................................................................................................................................  Patient Representative Print Name and Relationship to Patient    ..................................................               ................................................  Date                                            Time    ..........................................................................................................................................  Reviewed by Signature/Title    ...................................................              ..............................................  Date                                                            Time

## 2018-07-13 NOTE — IP AVS SNAPSHOT
MRN:[id number]                      After Visit Summary   7/13/2018    Lb Ornelas    MRN: [id number]           Thank you!     Thank you for choosing [facility] for your care. Our goal is always to provide you with excellent care. Hearing back from our patients is one way we can continue to improve our services. Please take a few minutes to complete the written survey that you may receive in the mail after you visit with us. Thank you!        Patient Information     Date Of Birth          1980        Designated Caregiver       Most Recent Value    Caregiver    Will someone help with your care after discharge? yes    Name of designated caregiver Nichelle Ornelas    Phone number of caregiver [phone]    Caregiver address [address] [place] [facility] [address]      About your hospital stay     You were admitted on:  July 13, 2018 [facility]    You were discharged on:  July 13, 2018       Who to Call     For medical emergencies, please call 911.  For non-urgent questions about your medical care, please call your primary care provider or clinic, [phone]  For questions related to your surgery, please call your surgery clinic        Attending Provider     Provider Gordo Duque MD Surgery       Primary Care Provider Office Phone # Fax #    Venessa Zarate [phone]      After Care Instructions     Diet Instructions       Resume pre-procedure diet            Discharge Instructions       Patient to follow up with appointment in 2 weeks with Dr. العلي.            Discharge Instructions       Follow up appointment as instructed by Surgeon and or RN            Dressing       Keep dressing clean and dry.  Dressing / incisional care:  Remove dressing 48 hours after surgery  On POD #2.  The steri strips protecting your incisions will fall off on their own.  You can shower and let water run over your incisions, do not soak or  scrub.   No sutures need to be removed.            No Alcohol       For 24 hours post procedure            No driving or operating machinery        until the day after procedure            Shower       No shower for 48 hours post procedure. May shower Postoperative Day (POD)  2            Weight bearing status - As tolerated                 Further instructions from your care team       Same Day Surgery Discharge Instructions for  Sedation and General Anesthesia       It's not unusual to feel dizzy, light-headed or faint for up to 24 hours after surgery or while taking pain medication.  If you have these symptoms: sit for a few minutes before standing and have someone assist you when you get up to walk or use the bathroom.      You should rest and relax for the next 24 hours. We recommend you make arrangements to have an adult stay with you for at least 24 hours after your discharge.  Avoid hazardous and strenuous activity.      DO NOT DRIVE any vehicle or operate mechanical equipment for 24 hours following the end of your surgery.  Even though you may feel normal, your reactions may be affected by the medication you have received.      Do not drink alcoholic beverages for 24 hours following surgery.       Slowly progress to your regular diet as you feel able. It's not unusual to feel nauseated and/or vomit after receiving anesthesia.  If you develop these symptoms, drink clear liquids (apple juice, ginger ale, broth, 7-up, etc. ) until you feel better.  If your nausea and vomiting persists for 24 hours, please notify your surgeon.        All narcotic pain medications, along with inactivity and anesthesia, can cause constipation. Drinking plenty of liquids and increasing fiber intake will help.      For any questions of a medical nature, call your surgeon.      Do not make important decisions for 24 hours.      If you had general anesthesia, you may have a sore throat for a couple of days related to the breathing  "tube used during surgery.  You may use Cepacol lozenges to help with this discomfort.  If it worsens or if you develop a fever, contact your surgeon.       If you feel your pain is not well managed with the pain medications prescribed by your surgeon, please contact your surgeon's office to let them know so they can address your concerns.       Today you were given 1000 mg of Tylenol at 0700. The recommended daily maximum dose is 4000 mg.     Today you received Toradol, an antiinflammatory medication similar to Ibuprofen.  You should not take other antiinflammatory medication, such as Ibuprofen, Motrin, Advil, Aleve, Naprosyn, etc, until 3 p.m.     Reasons to contact your surgeon:    1. Signs of possible infection: Check your incision daily for redness, swelling, warmth, red streaks or foul drainage.   2. Elevated temperature.  3. Pain not controlled with pain medication and/or rest.   4. Uncontrolled nausea or vomiting.  5. Any questions or concerns.      **If you have questions or concerns about your procedure  call Dr Gordo العلي at [phone]**    Pending Results     No orders found from 7/11/2018 to 7/14/2018.            Admission Information     Date & Time Provider Department Dept. Phone    7/13/2018 Gordo العلي MD [facility] PACU [phone]      Your Vitals Were     Blood Pressure Temperature Respirations Height Weight Pulse Oximetry    149/75 97.5  F (36.4  C) 16 1.854 m (6' 1\") 104.6 kg (230 lb 8 oz) 95%    BMI (Body Mass Index)                   30.41 kg/m2           [email or web address] Information     [email or web address] lets you send messages to your doctor, view your test results, renew your prescriptions, schedule appointments and more. To sign up, go to [email or web address] . Click on \"Log in\" on the left side of the screen, which will take you to the Welcome page. Then click on \"Sign up Now\" on the right side of the page.     You will be asked to enter the access code listed below, as well as " some personal information. Please follow the directions to create your username and password.     Your access code is: [email or web address]  Expires: 8/15/2018  2:42 PM     Your access code will  in 90 days. If you need help or a new code, please call your [facility] clinic or [phone].        Care EveryWhere ID     This is your Care EveryWhere ID. This could be used by other organizations to access your [facility] medical records  [id number]        Equal Access to Services     OMER HORTA : Hadii cassie ku hadasho Soomaali, waaxda luqadaha, qaybta kaalmada adeegyada, waxrenetta rogersin haymaikel quintero . So [facility] [place] [phone].    ATENCIÓN: Si habla español, tiene a anderson disposición servicios gratuitos de asistencia lingüística. Llame al [phone].    We comply with applicable federal civil rights laws and [place] laws. We do not discriminate on the basis of race, color, national origin, age, disability, sex, sexual orientation, or gender identity.               Review of your medicines      START taking        Dose / Directions    oxyCODONE-acetaminophen 5-325 MG per tablet   Commonly known as:  PERCOCET   Notes to Patient:  ONE TABLET TAKEN AT 9:53 A.M.        Dose:  1-2 tablet   Take 1-2 tablets by mouth every 4 hours as needed for pain (moderate to severe)   Quantity:  15 tablet   Refills:  0            Where to get your medicines      Some of these will need a paper prescription and others can be bought over the counter. Ask your nurse if you have questions.     Bring a paper prescription for each of these medications     oxyCODONE-acetaminophen 5-325 MG per tablet                Protect others around you: Learn how to safely use, store and throw away your medicines at www.disposemymeds.org.        Information about OPIOIDS     PRESCRIPTION OPIOIDS: WHAT YOU NEED TO KNOW   We gave you an opioid (narcotic) pain medicine. It is important to manage your pain, but opioids are not always the best choice. You  should first try all the other options your care team gave you. Take this medicine for as short a time (and as few doses) as possible.     These medicines have risks:    DO NOT drive when on new or higher doses of pain medicine. These medicines can affect your alertness and reaction times, and you could be arrested for driving under the influence (DUI). If you need to use opioids long-term, talk to your care team about driving.    DO NOT operate heave machinery    DO NOT do any other dangerous activities while taking these medicines.     DO NOT drink any alcohol while taking these medicines.      If the opioid prescribed includes acetaminophen, DO NOT take with any other medicines that contain acetaminophen. Read all labels carefully. Look for the word  acetaminophen  or  Tylenol.  Ask your pharmacist if you have questions or are unsure.    You can get addicted to pain medicines, especially if you have a history of addiction (chemical, alcohol or substance dependence). Talk to your care team about ways to reduce this risk.    Store your pills in a secure place, locked if possible. We will not replace any lost or stolen medicine. If you don t finish your medicine, please throw away (dispose) as directed by your pharmacist. The [place] Pollution Control Agency has more information about safe disposal: https://www.pca.[facility].mn.us/living-green/managing-unwanted-medications.     All opioids tend to cause constipation. Drink plenty of water and eat foods that have a lot of fiber, such as fruits, vegetables, prune juice, apple juice and high-fiber cereal. Take a laxative (Miralax, milk of magnesia, Colace, Senna) if you don t move your bowels at least every other day.              Medication List: This is a list of all your medications and when to take them. Check marks below indicate your daily home schedule. Keep this list as a reference.      Medications           Morning Afternoon Evening Bedtime As Needed     oxyCODONE-acetaminophen 5-325 MG per tablet   Commonly known as:  PERCOCET   Take 1-2 tablets by mouth every 4 hours as needed for pain (moderate to severe)   Last time this was given:  1 tablet on 7/13/2018  9:53 AM   Notes to Patient:  ONE TABLET TAKEN AT 9:53 A.M.

## 2018-07-13 NOTE — ANESTHESIA POSTPROCEDURE EVALUATION
Patient: Lb Potter    Procedure(s):  RIGHT AND LEFT PLANTARIS MUSCLE, TENDON, AND SOLEUS RELEASE - Wound Class: I-Clean    Diagnosis:POPLITEAL ENTRAPMENT SYNDROME  Diagnosis Additional Information: No value filed.    Anesthesia Type:  General, LMA    Note:  Anesthesia Post Evaluation    Patient location during evaluation: PACU  Patient participation: Able to fully participate in evaluation  Level of consciousness: awake and alert  Pain management: adequate  Airway patency: patent  Cardiovascular status: acceptable and hemodynamically stable  Respiratory status: nonlabored ventilation, unassisted and acceptable  Hydration status: acceptable  PONV: none             Last vitals:  Vitals:    07/13/18 1000 07/13/18 1015 07/13/18 1042   BP: 130/81 137/82 134/81   Resp: 14 12 16   Temp: 36.3  C (97.3  F) 36.4  C (97.5  F)    SpO2: 93% 94% 94%         Electronically Signed By: Jack Saldivar MD  July 13, 2018  6:15 PM

## 2018-07-13 NOTE — OP NOTE
Procedure Date: 07/13/2018      PREOPERATIVE DIAGNOSIS:  Bilateral symptomatic popliteal artery entrapment syndrome.      POSTOPERATIVE DIAGNOSIS:  Bilateral symptomatic popliteal artery entrapment syndrome.      PROCEDURES:   1.  Division/excision left plantaris muscle.   a.  Division of proximal left soleus fascial band.   b.  Mobilization neurovascular structures.   2.  Division/excision right plantaris muscle and tendon.   a.  Division right soleus fascial band.   b.  Mobilization neurovascular structures.      SURGEON:  Gordo العلي MD      ASSISTANT:  Dinh Castrejon MD, (Vascular Fellow)      :  Aubrie Washburn [place] [place]      ANESTHESIA:  General.      PREOPERATIVE MEDICATIONS:  Ancef 2 grams, Tylenol 1000 mg orally.      INDICATIONS:  A 37-year-old patient in the [employer], has had increasing problems with running to a point he can barely do this which affects his job.  He has undergone noninvasive testing which was positive for evidence of popliteal artery entrapment syndrome most likely from the plantaris muscle and soleus fascia.  He has failed all conservative treatment.  He comes to the operating today under informed consent.  Risks and benefits were discussed with the patient and his wife preoperatively.      PROCEDURE:  The patient was brought to the operating room, induced under general anesthesia.  LMA was placed.  Both legs were prepped and draped in sterile fashion.  He was very muscular, but not obese.  Timeout was called and the sites were identified.      Division/excision left plantaris muscle and tendon:  A 7-cm incision was made in the left proximal medial calf.  Dissection was carried down to the fascia avoiding the greater saphenous vein and nerve.  The fascia was divided avoiding injury to the semitendinosis tendon.  Gastrocnemius muscles was mobilized and retracted posteriorly.  We easily identified the plantaris tendon, this was clamped.  This was mobilized  distally and divided.  With the aid of loupe magnification and light source and deep retractors, we then mobilized the plantaris muscle until we were well lateral to the neurovascular structures.  This was then divided with electrocautery with excellent hemostasis and removed.  The remaining muscle retracted well lateral to the neurovascular structures with no encroachment.      Division left soleus fascia:  We then directed our attention to the left of the soleus fascial band.  We identified the neurovascular structures going into the deep posterior compartment.  This was a very tight opening that would not come close to admitting a digit.  Under direct visualization, we divided the fascial attachments of the proximal psoas for a length of 5 cm until we could easily pass a digit in the deep posterior compartment.  We then proceeded to cautiously mobilize the neurovascular structures, but made sure there were no structures medially attached to this.  We looked cephalad and again there was no obvious encroachment by the medial head of the gastrocnemius muscle.  Excellent hemostasis was noted. A small amount of Seprafilm was placed over the divided muscle and fascia to prevent postoperative adhesions.      Excision/division right plantaris muscle and tendon:  We then addressed our attention to the right leg.  A similar 7-cm incision was made.  Dissection was carried down to the fascia, again avoiding the greater saphenous vein and nerve.  The fascia was divided.  Gastrocnemius muscles retracted posteriorly.  The plantaris tendon was identified and clamped.  This was dissected free distally for approximately 6 cm and transected.  Again, with loupe magnification - light source and deep retractors, we mobilized the muscular head of the plantaris muscle which was of moderate size to we were well lateral to the neurovascular structures where it was divided with electrocautery with excellent hemostasis and removed.       Division right soleus fascia:  Again, there was a very tight opening to the deep posterior compartment.  Using a very similar technique, we divided with electrocautery to the soleus fascia for a length of 5 cm.  There was a crossing veins in the muscle, which was divided between 3-0 silk suture with excellent hemostasis.  Neurovascular structures were again mobilized to make sure there was no encroachment both proximally and distally.  A small amount of Seprafilm was placed over the divided muscle and fascia to prevent adhesions to the brachial plexus     The superficial fascia was closed with interrupted 3-0 Vicryl bilaterally.  Subcutaneous tissue was closed with interrupted 3-0 Vicryl and skin was closed with 4-0 Monocryl in subcutaneous fashion.  Wounds were infiltrated with 0.5% Marcaine post analgesia along with Toradol 30 mg IV.  Steri-Strips, gauze, cast rolls were applied, followed by thigh-high NICOLÁS stockings.      The patient tolerated the procedure well.      ESTIMATED BLOOD LOSS:  2 mL.      COMPLICATIONS:  None.      The patient will be discharged to home from recovery.  He is instructed in his postoperative cares and activities.         SKY DENISE MD             D: 2018   T: 2018   MT: MELLISSA      Name:     YULISA ELLER   MRN:      [id number]        Account:        [id number]   :      1980           Procedure Date: 2018      Document: [id number]

## 2018-07-13 NOTE — ANESTHESIA CARE TRANSFER NOTE
Patient: Lb Ornelas    Procedure(s):  RIGHT AND LEFT PLANTARIS MUSCLE, TENDON, AND SOLEUS RELEASE - Wound Class: I-Clean    Diagnosis: POPLITEAL ENTRAPMENT SYNDROME  Diagnosis Additional Information: No value filed.    Anesthesia Type:   General, LMA     Note:  Airway :Face Mask  Patient transferred to:PACU  Handoff Report: Identifed the Patient, Identified the Reponsible Provider, Reviewed the pertinent medical history, Discussed the surgical course, Reviewed Intra-OP anesthesia mangement and issues during anesthesia, Set expectations for post-procedure period and Allowed opportunity for questions and acknowledgement of understanding      Vitals: (Last set prior to Anesthesia Care Transfer)    CRNA VITALS  7/13/2018 0844 - 7/13/2018 0920      7/13/2018             Pulse: 111    SpO2: 99 %    Resp Rate (set): 10                Electronically Signed By: DIANA Lange CRNA  July 13, 2018  9:20 AM

## 2018-07-13 NOTE — DISCHARGE INSTRUCTIONS
Same Day Surgery Discharge Instructions for  Sedation and General Anesthesia       It's not unusual to feel dizzy, light-headed or faint for up to 24 hours after surgery or while taking pain medication.  If you have these symptoms: sit for a few minutes before standing and have someone assist you when you get up to walk or use the bathroom.      You should rest and relax for the next 24 hours. We recommend you make arrangements to have an adult stay with you for at least 24 hours after your discharge.  Avoid hazardous and strenuous activity.      DO NOT DRIVE any vehicle or operate mechanical equipment for 24 hours following the end of your surgery.  Even though you may feel normal, your reactions may be affected by the medication you have received.      Do not drink alcoholic beverages for 24 hours following surgery.       Slowly progress to your regular diet as you feel able. It's not unusual to feel nauseated and/or vomit after receiving anesthesia.  If you develop these symptoms, drink clear liquids (apple juice, ginger ale, broth, 7-up, etc. ) until you feel better.  If your nausea and vomiting persists for 24 hours, please notify your surgeon.        All narcotic pain medications, along with inactivity and anesthesia, can cause constipation. Drinking plenty of liquids and increasing fiber intake will help.      For any questions of a medical nature, call your surgeon.      Do not make important decisions for 24 hours.      If you had general anesthesia, you may have a sore throat for a couple of days related to the breathing tube used during surgery.  You may use Cepacol lozenges to help with this discomfort.  If it worsens or if you develop a fever, contact your surgeon.       If you feel your pain is not well managed with the pain medications prescribed by your surgeon, please contact your surgeon's office to let them know so they can address your concerns.       Today you were given 1000 mg of Tylenol at  0700. The recommended daily maximum dose is 4000 mg.     Today you received Toradol, an antiinflammatory medication similar to Ibuprofen.  You should not take other antiinflammatory medication, such as Ibuprofen, Motrin, Advil, Aleve, Naprosyn, etc, until 3 p.m.     Reasons to contact your surgeon:    1. Signs of possible infection: Check your incision daily for redness, swelling, warmth, red streaks or foul drainage.   2. Elevated temperature.  3. Pain not controlled with pain medication and/or rest.   4. Uncontrolled nausea or vomiting.  5. Any questions or concerns.      **If you have questions or concerns about your procedure  call Dr Gordo العلي at [phone]**

## 2018-07-16 ENCOUNTER — TELEPHONE (OUTPATIENT)
Dept: OTHER | Facility: CLINIC | Age: 38
End: 2018-07-16

## 2018-07-16 NOTE — TELEPHONE ENCOUNTER
[place] [facility]    I called Lb Ornelas after his PAES surgery on 7/13/2018 as an outpatient.  He has had some soreness as expected postoperative with the right being somewhat more than the left.  He does notice some tingling sensation as he rests his calf down.  He is wearing his compression stockings with no specific swelling.    He has been able to walk around his home.    Patient is in the [employer].  He will be bringing forms to the office to sign about when he will be able to run again which is the arm is requirement.  This may be very difficult to determine at this time and we may tentatively report that will be approximately 3 months though hopefully sooner than that.    Gordo العيل MD

## 2018-07-26 ENCOUNTER — OFFICE VISIT (OUTPATIENT)
Dept: OTHER | Facility: CLINIC | Age: 38
End: 2018-07-26
Attending: SURGERY
Payer: OTHER GOVERNMENT

## 2018-07-26 VITALS — DIASTOLIC BLOOD PRESSURE: 88 MMHG | HEART RATE: 78 BPM | SYSTOLIC BLOOD PRESSURE: 126 MMHG

## 2018-07-26 DIAGNOSIS — I77.89 POPLITEAL ARTERY ENTRAPMENT SYNDROME (H): Primary | ICD-10-CM

## 2018-07-26 PROCEDURE — G0463 HOSPITAL OUTPT CLINIC VISIT: HCPCS

## 2018-07-26 PROCEDURE — 99024 POSTOP FOLLOW-UP VISIT: CPT | Mod: ZP | Performed by: SURGERY

## 2018-07-26 NOTE — NURSING NOTE
"Lb Ornelas is a 37 year old male who presents for:  Chief Complaint   Patient presents with     RECHECK     1st PO; RIGHT AND LEFT PLANTARIS MUSCLE, TENDON AND SOLEUS RELEASE        Vitals:    Vitals:    07/26/18 1616   BP: 126/88   BP Location: Left arm   Patient Position: Chair   Cuff Size: Adult Large   Pulse: 78       BMI:  Estimated body mass index is 30.41 kg/(m^2) as calculated from the following:    Height as of 7/13/18: 6' 1\" (1.854 m).    Weight as of 7/13/18: 230 lb 8 oz (104.6 kg).    Pain Score:  Data Unavailable        Madhuri Metz"

## 2018-07-26 NOTE — PROGRESS NOTES
Returns for follow-up of his [facility]    Lb Ornelas excision of the plantaris muscle and tendon along with release of the soleus proximal fashion performed on 7/13/2018.  Patient was in the [employer] his job requires that he is able to run which he had been unable to do even with extensive conservative treatment and physical therapy.  He went home on the evening of the surgery and was doing well when I called him on 7/16/2018.    Overall he is done very well following surgery.  He has some intermittent numbness along the distribution of the greater saphenous nerve due to traction that should improve.  He has had no swelling.  Surgical incisions are healing well.    Patient is in the [place] [employer].  His job requires lifting and carrying and running and wearing his uniform.  He started doing some running but despite his legs feeling better than before surgery they are still somewhat weak from his long-term issues.  We expect this will gradually improve with time.    He is going up to [facility] for a two-week training session.  I filled out the [employer]'s disability form.  He basically may do all activities but the kind events such as swimming and running 2 miles or more are not appropriate at this time since he still recovering from surgery.  We have listed this is of permanent disability but this means and the [employer] that this can be reevaluated and changed accordingly after he is recovered from the surgery.  Thus we will plan to see him again in 2 months for reevaluation.  There are no specific weight restrictions just the restrictions on time activities.  He may run if he is able and goes longer distances he is able to do with no harm to the surgical site.    It is difficult to determine whether will have complete recovery which is our hope and how long to he gets to that point.  This was discussed prior to surgery and again reviewed today.      Gordo العلي MD      Please route or send letter to:  Primary Care Provider (PCP)

## 2018-07-26 NOTE — MR AVS SNAPSHOT
"              After Visit Summary   2018    Lb Ornelas    MRN: [id number]           Patient Information     Date Of Birth          1980        Visit Information        Provider Department      2018 4:15 PM Gordo العلي MD [facility] at  [facility] [place]      Today's Diagnoses     Popliteal artery entrapment syndrome (H)    -  1       Follow-ups after your visit        Follow-up notes from your care team     Return in about 2 months (around 2018).      Who to contact     If you have questions or need follow up information about today's clinic visit or your schedule please contact [facility] directly at [phone].  Normal or non-critical lab and imaging results will be communicated to you by MyChart, letter or phone within 4 business days after the clinic has received the results. If you do not hear from us within 7 days, please contact the clinic through MyChart or phone. If you have a critical or abnormal lab result, we will notify you by phone as soon as possible.  Submit refill requests through [employer] or call your pharmacy and they will forward the refill request to us. Please allow 3 business days for your refill to be completed.          Additional Information About Your Visit        MyChart Information     [employer] lets you send messages to your doctor, view your test results, renew your prescriptions, schedule appointments and more. To sign up, go to [email or web address] . Click on \"Log in\" on the left side of the screen, which will take you to the Welcome page. Then click on \"Sign up Now\" on the right side of the page.     You will be asked to enter the access code listed below, as well as some personal information. Please follow the directions to create your username and password.     Your access code is: [email or web address]  Expires: 10/24/2018  4:01 PM     Your access code will  in 90 days. If you " need help or a new code, please call your [facility] clinic or [phone].        Care EveryWhere ID     This is your Care EveryWhere ID. This could be used by other organizations to access your [facility] medical records  [id number]        Your Vitals Were     Pulse                   78            Blood Pressure from Last 3 Encounters:   07/26/18 126/88   07/13/18 134/81   05/17/18 117/79    Weight from Last 3 Encounters:   07/13/18 230 lb 8 oz (104.6 kg)   05/17/18 225 lb (102.1 kg)              Today, you had the following     No orders found for display       Primary Care Provider Office Phone # Fax #    Venessa Zarate [phone]       [facility] [address]  [facility] [address]        Equal Access to Services     OMER HORTA : Hadii cassie sevilla hadasho Sodamián, waaxda luqadaha, qaybta kaalmada aderodriyalion, thanh quintero . So [facility] [phone].    ATENCIÓN: Si habla español, tiene a anderson disposición servicios gratuitos de asistencia lingüística. Brenda al [phone].    We comply with applicable federal civil rights laws and [place] laws. We do not discriminate on the basis of race, color, national origin, age, disability, sex, sexual orientation, or gender identity.            Thank you!     Thank you for choosing [facility]ASC[facility] [place]  for your care. Our goal is always to provide you with excellent care. Hearing back from our patients is one way we can continue to improve our services. Please take a few minutes to complete the written survey that you may receive in the mail after your visit with us. Thank you!             Your Updated Medication List - Protect others around you: Learn how to safely use, store and throw away your medicines at www.disposemymeds.org.          This list is accurate as of 7/26/18  4:58 PM.  Always use your most recent med list.                   Brand Name Dispense Instructions for use Diagnosis     oxyCODONE-acetaminophen 5-325 MG per tablet    PERCOCET    15 tablet    Take 1-2 tablets by mouth every 4 hours as needed for pain (moderate to severe)    Popliteal artery entrapment syndrome (H)

## 2018-07-26 NOTE — LETTER
[facility]  [address]  [place] [address]  Phone: [phone]  Fax: [phone]    2018    Re: Lb Ornelas, : 1980    [facility]     Lb Ornelas excision of the plantaris muscle and tendon along with release of the soleus proximal fashion performed on 2018.  Patient was in the [employer] his job requires that he is able to run which he had been unable to do even with extensive conservative treatment and physical therapy.  He went home on the evening of the surgery and was doing well when I called him on 2018.     Overall he is done very well following surgery.  He has some intermittent numbness along the distribution of the greater saphenous nerve due to traction that should improve.  He has had no swelling.  Surgical incisions are healing well.     Patient is in the [place] [employer].  His job requires lifting and carrying and running and wearing his uniform.  He started doing some running but despite his legs feeling better than before surgery they are still somewhat weak from his long-term issues.  We expect this will gradually improve with time.     He is going up to [facility] for a two-week training session.  I filled out the [employer] disability form.  He basically may do all activities but the kind events such as swimming and running 2 miles or more are not appropriate at this time since he still recovering from surgery.  We have listed this is of permanent disability but this means and the [employer] that this can be reevaluated and changed accordingly after he is recovered from the surgery.  Thus we will plan to see him again in 2 months for reevaluation.  There are no specific weight restrictions just the restrictions on time activities.  He may run if he is able and goes longer distances he is able to do with no harm to the surgical site.     It is difficult to determine  whether will have complete recovery which is our hope and how long to he gets to that point.  This was discussed prior to surgery and again reviewed today.    Gordo العلي MD

## 2018-12-05 NOTE — PROGRESS NOTES
[place] [facility]    Lb Ornelas Returns for vascular follow-up.  He had evidence of popliteal artery entrapment syndrome since 2015.  Patient was in the [employer] and his leg discomfort prevented him from running the required distance to stay in the .  He was also very motivated to find an answer to his problem especially since he failed all conservative treatment including physical therapy.    Preoperative evaluation revealed a bilateral decreased DAVION with exercise down to 0.83 on the right and 0.8 on the left associated with pain.  Significant compression of the popliteal artery was noted in the below-knee segment with forced plantar flexion bilaterally.    He underwent bilateral revision/excision of the plantaris muscle and tendon along with division of the soleus fascial bands and proximal muscular attachments and mobilization of both neurovascular structures on 7/13/2018.  Patient tolerated procedure well was doing well on his last follow-up this past summer.    He is in the [employer].  He does have to run to some degree.  He did start running this past summer but still is running shorter distance that 2-4 minutes at 6 miles an hour.  He reports he has some numbness related to the greater saphenous nerve somewhat more on the right than left.  With his running his right leg actually feels fairly good.  However, as he runs the left leg will have some tightness and discomfort on the posterior medial aspect.  The pain will linger sometimes to the following day the calf.  He is usually able to run through the discomfort.  Pain does increase his increases his running speed and distance.  No swelling.    Exam: Alert and appropriate.  Blood pressure 113/80.  Pulse 86.             Both medial calf incisions of healed well.             Excellent distal pulses.                Both calves are soft.  No swelling.  Normal sensation except                     Some numbness along the  greater saphenous nerve distribution.      Impression: Some improvement but still ongoing symptoms particular left leg.  This may represent scar tissue.  We discussed this in the visit today.  We will repeat our testing including DAVION with exercise and evaluation of the popliteal artery and veins with maneuvers to see if any compression is noted.  This will be done and I will call him with results.                       Prior to his surgery for both ABIs with exercise and popliteal arteries and veins were compressed.      Gordo العلي MD     Please route or send letter to:  *None*

## 2018-12-06 ENCOUNTER — OFFICE VISIT (OUTPATIENT)
Dept: OTHER | Facility: CLINIC | Age: 38
End: 2018-12-06
Attending: SURGERY
Payer: OTHER GOVERNMENT

## 2018-12-06 VITALS — DIASTOLIC BLOOD PRESSURE: 80 MMHG | HEART RATE: 86 BPM | SYSTOLIC BLOOD PRESSURE: 113 MMHG

## 2018-12-06 DIAGNOSIS — I77.89 POPLITEAL ARTERY ENTRAPMENT SYNDROME (H): Primary | ICD-10-CM

## 2018-12-06 PROCEDURE — G0463 HOSPITAL OUTPT CLINIC VISIT: HCPCS

## 2018-12-06 PROCEDURE — 99213 OFFICE O/P EST LOW 20 MIN: CPT | Mod: ZP | Performed by: SURGERY

## 2018-12-06 NOTE — LETTER
[facility]  [address]  [place] [address]  Phone: [phone]  Fax: [phone]    2018    Re: Lb Ornelas, : 1980    [place] [facility]     Lb Ornelas Returns for vascular follow-up.  He had evidence of popliteal artery entrapment syndrome since .  Patient was in the [employer] and his leg discomfort prevented him from running the required distance to stay in the .  He was also very motivated to find an answer to his problem especially since he failed all conservative treatment including physical therapy.     Preoperative evaluation revealed a bilateral decreased DAVION with exercise down to 0.83 on the right and 0.8 on the left associated with pain.  Significant compression of the popliteal artery was noted in the below-knee segment with forced plantar flexion bilaterally.     He underwent bilateral revision/excision of the plantaris muscle and tendon along with division of the soleus fascial bands and proximal muscular attachments and mobilization of both neurovascular structures on 2018.  Patient tolerated procedure well was doing well on his last follow-up this past summer.     He is in the [employer].  He does have to run to some degree.  He did start running this past summer but still is running shorter distance that 2-4 minutes at 6 miles an hour.  He reports he has some numbness related to the greater saphenous nerve somewhat more on the right than left.  With his running his right leg actually feels fairly good.  However, as he runs the left leg will have some tightness and discomfort on the posterior medial aspect.  The pain will linger sometimes to the following day the calf.  He is usually able to run through the discomfort. Pain does increase his increases his running speed and distance.  No swelling.     Exam: Alert and appropriate.  Blood pressure 113/80.  Pulse 86.             Both medial  calf incisions of healed well.             Excellent distal pulses.               Both calves are soft.  No swelling.  Normal sensation except                Some numbness along the greater saphenous nerve distribution.     Impression: Some improvement but still ongoing symptoms particular left leg.  This may represent scar tissue.  We discussed this in the visit today.  We will repeat our testing including DAVION with exercise and evaluation of the popliteal artery and veins with maneuvers to see if any compression is noted.  This will be done and I will call him with results.                    Prior to his surgery for both ABIs with exercise and popliteal arteries and veins were compressed.      Gordo العلي MD

## 2018-12-06 NOTE — NURSING NOTE
"Lb Ornelas is a 38 year old male who presents for:  Chief Complaint   Patient presents with     RECHECK     follow up appointment. History of bilateral plantaris muscle, tendon, and soleus release.        Vitals:    Vitals:    12/06/18 1016   BP: 113/80   BP Location: Left arm   Patient Position: Sitting   Cuff Size: Adult Large   Pulse: 86       BMI:  Estimated body mass index is 30.41 kg/(m^2) as calculated from the following:    Height as of 7/13/18: 6' 1\" (1.854 m).    Weight as of 7/13/18: 230 lb 8 oz (104.6 kg).    Pain Score:  Data Unavailable        Yolanda Sherman      "

## 2018-12-06 NOTE — MR AVS SNAPSHOT
After Visit Summary   12/6/2018    Lb Ornelas    MRN: [id number]           Patient Information     Date Of Birth          1980        Visit Information        Provider Department      12/6/2018 10:30 AM Gordo العلي MD [facility] at  V[facility] [place]      Today's Diagnoses     Popliteal artery entrapment syndrome (H)    -  1       Follow-ups after your visit        Your next 10 appointments already scheduled     Dec 21, 2018 10:00 AM CST   US DAVION DOPPLER WITH EXERCISE BILATERAL with VUS4   [facility] ([facility])    [address]  [place] [address]   [phone]           How do I prepare for my exam? (Food and drink instructions) No caffeine or tobacco for 1 hour prior to exam.  What should I wear: Wear comfortable clothes.  How long does the exam take: Most ultrasounds take 30 to 60 minutes.  What should I bring: Bring a list of your medicines, including vitamins, minerals and over-the-counter drugs. It is safest to leave personal items at home.  Do I need a :  No  is needed.  What do I need to tell my doctor: Tell your doctor about any allergies you may have.  What should I do after the exam: No restrictions, You may resume normal activities.  What is this test: An ultrasound uses sound waves to make pictures of the body. Sound waves do not cause pain. The only discomfort may be the pressure of the wand against your skin or full bladder.  Who should I call with questions: If you have any questions, please call the Imaging Department where you will have your exam. Directions, parking instructions, and other information is available on our website, [email or web address].            Dec 21, 2018 10:30 AM CST   US LOWER EXTREMITY ARTERIAL DUPLEX BILATERAL with VUS4   [facility]trasound ([facility]  [facility])    [address]  [address]  [place] [address]   [phone]           How do I prepare for my exam? (Food and drink instructions) No Food and Drink Restrictions.  How do I prepare for my exam? (Other instructions) You do not need to do anything special to prepare for your exam.  What should I wear: Wear comfortable clothes.  How long does the exam take: Most ultrasounds take 30 to 60 minutes.  What should I bring: Bring a list of your medicines, including vitamins, minerals and over-the-counter drugs. It is safest to leave personal items at home.  Do I need a :  No  is needed.  What do I need to tell my doctor: Tell your doctor about any allergies you may have.  What should I do after the exam: No restrictions, You may resume normal activities.  What is this test: An ultrasound uses sound waves to make pictures of the body. Sound waves do not cause pain. The only discomfort may be the pressure of the wand against your skin or full bladder.  Who should I call with questions: If you have any questions, please call the Imaging Department where you will have your exam. Directions, parking instructions, and other information is available on our website, [email or web address].            Dec 21, 2018 11:15 AM CST   US LOWER EXTREMITY VENOUS DUPLEX BILATERAL with SHVUS4   [facility] ([facility])    [address]  [address]  [place] [address]   [phone]           How do I prepare for my exam? (Food and drink instructions) No Food and Drink Restrictions.  How do I prepare for my exam? (Other instructions) You do not need to do anything special to prepare for your exam.  What should I wear: Wear comfortable clothes.  How long does the exam take: Most ultrasounds take 30 to 60 minutes.  What should I bring: Bring a list of your medicines, including vitamins, minerals and over-the-counter drugs. It is safest to leave personal items at  home.  Do I need a :  No  is needed.  What do I need to tell my doctor: Tell your doctor about any allergies you may have.  What should I do after the exam: No restrictions, You may resume normal activities.  What is this test: An ultrasound uses sound waves to make pictures of the body. Sound waves do not cause pain. The only discomfort may be the pressure of the wand against your skin or full bladder.  Who should I call with questions: If you have any questions, please call the Imaging Department where you will have your exam. Directions, parking instructions, and other information is available on our website, [email or web address].              Future tests that were ordered for you today     Open Future Orders        Priority Expected Expires Ordered    US DAVION Doppler with Exercise Bilateral Routine 12/6/2018 12/6/2019 12/6/2018    US Lower Extremity Arterial Duplex Bilateral Routine 12/6/2018 12/6/2019 12/6/2018    US Lower Extremity Venous Duplex Bilateral Routine 12/6/2018 12/6/2019 12/6/2018            Who to contact     If you have questions or need follow up information about today's clinic visit or your schedule please contact [facility] directly at [phone].  Normal or non-critical lab and imaging results will be communicated to you by MyChart, letter or phone within 4 business days after the clinic has received the results. If you do not hear from us within 7 days, please contact the clinic through MyChart or phone. If you have a critical or abnormal lab result, we will notify you by phone as soon as possible.  Submit refill requests through [employer] or call your pharmacy and they will forward the refill request to us. Please allow 3 business days for your refill to be completed.          Additional Information About Your Visit        [employer] Information     [employer] gives you secure access to your electronic health record. If you see a primary care provider, you can  also send messages to your care team and make appointments. If you have questions, please call your primary care clinic.  If you do not have a primary care provider, please call [phone] and they will assist you.        Care EveryWhere ID     This is your Care EveryWhere ID. This could be used by other organizations to access your [facility] medical records  [id number]        Your Vitals Were     Pulse                   86            Blood Pressure from Last 3 Encounters:   12/06/18 113/80   07/26/18 126/88   07/13/18 134/81    Weight from Last 3 Encounters:   07/13/18 230 lb 8 oz (104.6 kg)   05/17/18 225 lb (102.1 kg)              We Performed the Following     Follow-Up with Vascular Surgery        Primary Care Provider Office Phone # Fax #    Venessa Zarate [phone]       [facility] [address] [address] [facility] [address]        Equal Access to Services     OMER HORTA : Hadii aad ku hadasho Soomaali, waaxda luqadaha, qaybta kaalmada adeegyada, waxay kenin haymollyn cosme quintero . So [facility] [phone].    ATENCIÓN: Si habla español, tiene a anderson disposición servicios gratuitos de asistencia lingüística. Brenda al [phone].    We comply with applicable federal civil rights laws and [place] laws. We do not discriminate on the basis of race, color, national origin, age, disability, sex, sexual orientation, or gender identity.            Thank you!     Thank you for choosing [facility] [place]  for your care. Our goal is always to provide you with excellent care. Hearing back from our patients is one way we can continue to improve our services. Please take a few minutes to complete the written survey that you may receive in the mail after your visit with us. Thank you!             Your Updated Medication List - Protect others around you: Learn how to safely use, store and throw away your medicines at www.disposemymeds.org.          This list is  accurate as of 12/6/18 10:51 AM.  Always use your most recent med list.                   Brand Name Dispense Instructions for use Diagnosis    oxyCODONE-acetaminophen 5-325 MG tablet    PERCOCET    15 tablet    Take 1-2 tablets by mouth every 4 hours as needed for pain (moderate to severe)    Popliteal artery entrapment syndrome (H)

## 2018-12-21 ENCOUNTER — HOSPITAL ENCOUNTER (OUTPATIENT)
Dept: ULTRASOUND IMAGING | Facility: CLINIC | Age: 38
Discharge: HOME | End: 2018-12-21
Attending: SURGERY | Admitting: SURGERY
Payer: OTHER GOVERNMENT

## 2018-12-21 ENCOUNTER — HOSPITAL ENCOUNTER (OUTPATIENT)
Dept: ULTRASOUND IMAGING | Facility: CLINIC | Age: 38
End: 2018-12-21
Attending: SURGERY
Payer: OTHER GOVERNMENT

## 2018-12-21 DIAGNOSIS — I77.89 POPLITEAL ARTERY ENTRAPMENT SYNDROME (H): ICD-10-CM

## 2018-12-21 PROCEDURE — 93924 LWR XTR VASC STDY BILAT: CPT

## 2018-12-21 PROCEDURE — 93925 LOWER EXTREMITY STUDY: CPT

## 2018-12-21 PROCEDURE — 93970 EXTREMITY STUDY: CPT

## 2018-12-24 ENCOUNTER — TELEPHONE (OUTPATIENT)
Dept: OTHER | Facility: CLINIC | Age: 38
End: 2018-12-24

## 2018-12-27 ENCOUNTER — TELEPHONE (OUTPATIENT)
Dept: OTHER | Facility: CLINIC | Age: 38
End: 2018-12-27

## 2018-12-27 NOTE — TELEPHONE ENCOUNTER
[place] [facility]    Lb Ornelas is in [employer].  He developed significant pain in both of his calfs associated with running make it unable for him to do his job.  He underwent excision of the plantaris muscle and tendon along with opening of the soleus fascial bands and proximal muscular attachments bilaterally on 7/13/2018.    He actually had good improvement of his symptoms in the right leg but had ongoing issues with his left leg.  He was seen in the office on 12/6/2018 and we recommended reevaluation testing.      Ankle-brachial index is 1.08 on the right and 1.11 on the left with triphasic waveforms at rest.  With exercise is decreased is a 0.9 on the right and 0.84 on the left.    Popliteal venous exam is normal except for on the right distal popliteal vein decreased size with complete compression with dorsiflexion distally.    Popliteal arterial exam is completely normal at all maneuvers on the right.  However on the left distal popliteal artery is 6.3 mm at rest and decreases to 1.7 mm of plantarflexion.  This is not changed with dorsiflexion.      Impression: Recurrent left leg symptoms most likely due to ongoing compression of the below-knee distal popliteal artery with plantar flexion to be the maneuver associated with running.  Mid popliteal artery is normal in all positions.  This certainly could explain his symptoms and the decrease in DAVION.  This is likely secondary to scar tissue.  Would consider reexploration of the left popliteal region.    Symptoms are minimal on the right though there is some evidence of venous compression but no arterial compression.  If his symptoms remain minimal I would not recommend reexploration of the right side at this time.      I left a message with the patient that we called we will try to discuss this with him when he is available.       Gordo العلي MD

## 2019-01-24 ENCOUNTER — TELEPHONE (OUTPATIENT)
Dept: OTHER | Facility: CLINIC | Age: 39
End: 2019-01-24

## 2019-01-24 NOTE — TELEPHONE ENCOUNTER
Type of surgery: REDO LEFT POPLITEAL ENTRAPMENT, REDIVISION OF LEFT SOLEUS MUSCLE  Location of surgery: [facility] OR  Date and time of surgery: 591419 AT 9:55 AM  Surgeon: DR NICK DENISE  Pre-Op Appt Date: UNKNOWN  Post-Op Appt Date: UNKNOWN   Packet sent out: MAILED 1/21/19  Pre-cert/Authorization completed:  SENT FOR PA SUBMISSION  Date: [id number] Jody Candelario -  at [facility]

## 2019-02-15 ENCOUNTER — ANESTHESIA (OUTPATIENT)
Dept: SURGERY | Facility: CLINIC | Age: 39
DRG: 254 | End: 2019-02-15
Payer: OTHER GOVERNMENT

## 2019-02-15 ENCOUNTER — APPOINTMENT (OUTPATIENT)
Dept: SURGERY | Facility: PHYSICIAN GROUP | Age: 39
End: 2019-02-15
Payer: OTHER GOVERNMENT

## 2019-02-15 ENCOUNTER — ANESTHESIA EVENT (OUTPATIENT)
Dept: SURGERY | Facility: CLINIC | Age: 39
DRG: 254 | End: 2019-02-15
Payer: OTHER GOVERNMENT

## 2019-02-15 ENCOUNTER — HOSPITAL ENCOUNTER (INPATIENT)
Facility: CLINIC | Age: 39
LOS: 1 days | Discharge: HOME | DRG: 254 | End: 2019-02-15
Attending: SURGERY | Admitting: SURGERY
Payer: OTHER GOVERNMENT

## 2019-02-15 VITALS
WEIGHT: 229.7 LBS | DIASTOLIC BLOOD PRESSURE: 69 MMHG | RESPIRATION RATE: 16 BRPM | HEART RATE: 85 BPM | OXYGEN SATURATION: 93 % | SYSTOLIC BLOOD PRESSURE: 139 MMHG | BODY MASS INDEX: 30.44 KG/M2 | TEMPERATURE: 97.9 F | HEIGHT: 73 IN

## 2019-02-15 DIAGNOSIS — I77.89 POPLITEAL ARTERY ENTRAPMENT SYNDROME (H): Primary | ICD-10-CM

## 2019-02-15 PROCEDURE — 25800030 ZZH RX IP 258 OP 636: Performed by: NURSE ANESTHETIST, CERTIFIED REGISTERED

## 2019-02-15 PROCEDURE — 25000566 ZZH SEVOFLURANE, EA 15 MIN: Performed by: SURGERY

## 2019-02-15 PROCEDURE — 25000125 ZZHC RX 250: Performed by: NURSE ANESTHETIST, CERTIFIED REGISTERED

## 2019-02-15 PROCEDURE — 35741: CPT | Performed by: SURGERY

## 2019-02-15 PROCEDURE — 71000012 ZZH RECOVERY PHASE 1 LEVEL 1 FIRST HR: Performed by: SURGERY

## 2019-02-15 PROCEDURE — 36000063 ZZH SURGERY LEVEL 4 EA 15 ADDTL MIN: Performed by: SURGERY

## 2019-02-15 PROCEDURE — 12000000 ZZH R&B MED SURG/OB

## 2019-02-15 PROCEDURE — 25000128 H RX IP 250 OP 636: Performed by: NURSE ANESTHETIST, CERTIFIED REGISTERED

## 2019-02-15 PROCEDURE — C1765 ADHESION BARRIER: HCPCS | Performed by: SURGERY

## 2019-02-15 PROCEDURE — 25000132 ZZH RX MED GY IP 250 OP 250 PS 637: Performed by: SURGERY

## 2019-02-15 PROCEDURE — 25000125 ZZHC RX 250: Performed by: SURGERY

## 2019-02-15 PROCEDURE — 25000128 H RX IP 250 OP 636: Performed by: SURGERY

## 2019-02-15 PROCEDURE — 27210794 ZZH OR GENERAL SUPPLY STERILE: Performed by: SURGERY

## 2019-02-15 PROCEDURE — 37000009 ZZH ANESTHESIA TECHNICAL FEE, EACH ADDTL 15 MIN: Performed by: SURGERY

## 2019-02-15 PROCEDURE — 0K8T0ZZ DIVISION OF LEFT LOWER LEG MUSCLE, OPEN APPROACH: ICD-10-PCS | Performed by: SURGERY

## 2019-02-15 PROCEDURE — 25000128 H RX IP 250 OP 636: Performed by: ANESTHESIOLOGY

## 2019-02-15 PROCEDURE — 37000008 ZZH ANESTHESIA TECHNICAL FEE, 1ST 30 MIN: Performed by: SURGERY

## 2019-02-15 PROCEDURE — 25800030 ZZH RX IP 258 OP 636: Performed by: ANESTHESIOLOGY

## 2019-02-15 PROCEDURE — 04NN0ZZ RELEASE LEFT POPLITEAL ARTERY, OPEN APPROACH: ICD-10-PCS | Performed by: SURGERY

## 2019-02-15 PROCEDURE — 40000170 ZZH STATISTIC PRE-PROCEDURE ASSESSMENT II: Performed by: SURGERY

## 2019-02-15 PROCEDURE — 71000027 ZZH RECOVERY PHASE 2 EACH 15 MINS: Performed by: SURGERY

## 2019-02-15 PROCEDURE — 36000093 ZZH SURGERY LEVEL 4 1ST 30 MIN: Performed by: SURGERY

## 2019-02-15 RX ORDER — MAGNESIUM HYDROXIDE 1200 MG/15ML
LIQUID ORAL PRN
Status: DISCONTINUED | OUTPATIENT
Start: 2019-02-15 | End: 2019-02-15 | Stop reason: HOSPADM

## 2019-02-15 RX ORDER — LIDOCAINE HYDROCHLORIDE 20 MG/ML
INJECTION, SOLUTION INFILTRATION; PERINEURAL PRN
Status: DISCONTINUED | OUTPATIENT
Start: 2019-02-15 | End: 2019-02-15

## 2019-02-15 RX ORDER — HYDROCODONE BITARTRATE AND ACETAMINOPHEN 5; 325 MG/1; MG/1
1 TABLET ORAL ONCE
Status: COMPLETED | OUTPATIENT
Start: 2019-02-15 | End: 2019-02-15

## 2019-02-15 RX ORDER — ONDANSETRON 2 MG/ML
4 INJECTION INTRAMUSCULAR; INTRAVENOUS EVERY 30 MIN PRN
Status: DISCONTINUED | OUTPATIENT
Start: 2019-02-15 | End: 2019-02-15 | Stop reason: HOSPADM

## 2019-02-15 RX ORDER — HYDROMORPHONE HYDROCHLORIDE 1 MG/ML
.3-.5 INJECTION, SOLUTION INTRAMUSCULAR; INTRAVENOUS; SUBCUTANEOUS EVERY 5 MIN PRN
Status: DISCONTINUED | OUTPATIENT
Start: 2019-02-15 | End: 2019-02-15 | Stop reason: HOSPADM

## 2019-02-15 RX ORDER — HYDROCODONE BITARTRATE AND ACETAMINOPHEN 5; 325 MG/1; MG/1
1-2 TABLET ORAL EVERY 4 HOURS PRN
Qty: 20 TABLET | Refills: 0 | Status: SHIPPED | OUTPATIENT
Start: 2019-02-15 | End: 2019-02-18

## 2019-02-15 RX ORDER — SODIUM CHLORIDE, SODIUM LACTATE, POTASSIUM CHLORIDE, CALCIUM CHLORIDE 600; 310; 30; 20 MG/100ML; MG/100ML; MG/100ML; MG/100ML
INJECTION, SOLUTION INTRAVENOUS CONTINUOUS
Status: DISCONTINUED | OUTPATIENT
Start: 2019-02-15 | End: 2019-02-15 | Stop reason: HOSPADM

## 2019-02-15 RX ORDER — KETOROLAC TROMETHAMINE 30 MG/ML
INJECTION, SOLUTION INTRAMUSCULAR; INTRAVENOUS PRN
Status: DISCONTINUED | OUTPATIENT
Start: 2019-02-15 | End: 2019-02-15

## 2019-02-15 RX ORDER — BUPIVACAINE HYDROCHLORIDE 5 MG/ML
INJECTION, SOLUTION PERINEURAL PRN
Status: DISCONTINUED | OUTPATIENT
Start: 2019-02-15 | End: 2019-02-15 | Stop reason: HOSPADM

## 2019-02-15 RX ORDER — ONDANSETRON 4 MG/1
4 TABLET, ORALLY DISINTEGRATING ORAL EVERY 30 MIN PRN
Status: DISCONTINUED | OUTPATIENT
Start: 2019-02-15 | End: 2019-02-15 | Stop reason: HOSPADM

## 2019-02-15 RX ORDER — GLYCOPYRROLATE 0.2 MG/ML
INJECTION, SOLUTION INTRAMUSCULAR; INTRAVENOUS PRN
Status: DISCONTINUED | OUTPATIENT
Start: 2019-02-15 | End: 2019-02-15

## 2019-02-15 RX ORDER — ACETAMINOPHEN 325 MG/1
650 TABLET ORAL
Status: DISCONTINUED | OUTPATIENT
Start: 2019-02-15 | End: 2019-02-15 | Stop reason: HOSPADM

## 2019-02-15 RX ORDER — AMOXICILLIN 250 MG
1-2 CAPSULE ORAL 2 TIMES DAILY
Qty: 120 TABLET | Refills: 0 | Status: SHIPPED | OUTPATIENT
Start: 2019-02-15 | End: 2019-03-17

## 2019-02-15 RX ORDER — ACETAMINOPHEN 500 MG
1000 TABLET ORAL ONCE
Status: COMPLETED | OUTPATIENT
Start: 2019-02-15 | End: 2019-02-15

## 2019-02-15 RX ORDER — FENTANYL CITRATE 50 UG/ML
50-100 INJECTION, SOLUTION INTRAMUSCULAR; INTRAVENOUS
Status: COMPLETED | OUTPATIENT
Start: 2019-02-15 | End: 2019-02-15

## 2019-02-15 RX ORDER — CEFAZOLIN SODIUM 2 G/100ML
2 INJECTION, SOLUTION INTRAVENOUS
Status: COMPLETED | OUTPATIENT
Start: 2019-02-15 | End: 2019-02-15

## 2019-02-15 RX ORDER — ONDANSETRON 2 MG/ML
INJECTION INTRAMUSCULAR; INTRAVENOUS PRN
Status: DISCONTINUED | OUTPATIENT
Start: 2019-02-15 | End: 2019-02-15

## 2019-02-15 RX ORDER — NALOXONE HYDROCHLORIDE 0.4 MG/ML
.1-.4 INJECTION, SOLUTION INTRAMUSCULAR; INTRAVENOUS; SUBCUTANEOUS
Status: DISCONTINUED | OUTPATIENT
Start: 2019-02-15 | End: 2019-02-15 | Stop reason: HOSPADM

## 2019-02-15 RX ORDER — EPHEDRINE SULFATE 50 MG/ML
INJECTION, SOLUTION INTRAMUSCULAR; INTRAVENOUS; SUBCUTANEOUS PRN
Status: DISCONTINUED | OUTPATIENT
Start: 2019-02-15 | End: 2019-02-15

## 2019-02-15 RX ORDER — DIMENHYDRINATE 50 MG/ML
12.5 INJECTION, SOLUTION INTRAMUSCULAR; INTRAVENOUS
Status: DISCONTINUED | OUTPATIENT
Start: 2019-02-15 | End: 2019-02-15 | Stop reason: HOSPADM

## 2019-02-15 RX ORDER — FENTANYL CITRATE 50 UG/ML
25-50 INJECTION, SOLUTION INTRAMUSCULAR; INTRAVENOUS
Status: DISCONTINUED | OUTPATIENT
Start: 2019-02-15 | End: 2019-02-15 | Stop reason: HOSPADM

## 2019-02-15 RX ORDER — PROPOFOL 10 MG/ML
INJECTION, EMULSION INTRAVENOUS PRN
Status: DISCONTINUED | OUTPATIENT
Start: 2019-02-15 | End: 2019-02-15

## 2019-02-15 RX ORDER — IBUPROFEN 600 MG/1
600 TABLET, FILM COATED ORAL EVERY 6 HOURS PRN
Qty: 30 TABLET | Refills: 0 | Status: SHIPPED | OUTPATIENT
Start: 2019-02-15 | End: 2019-03-17

## 2019-02-15 RX ORDER — MEPERIDINE HYDROCHLORIDE 25 MG/ML
12.5 INJECTION INTRAMUSCULAR; INTRAVENOUS; SUBCUTANEOUS EVERY 5 MIN PRN
Status: DISCONTINUED | OUTPATIENT
Start: 2019-02-15 | End: 2019-02-15 | Stop reason: HOSPADM

## 2019-02-15 RX ORDER — DEXAMETHASONE SODIUM PHOSPHATE 4 MG/ML
INJECTION, SOLUTION INTRA-ARTICULAR; INTRALESIONAL; INTRAMUSCULAR; INTRAVENOUS; SOFT TISSUE PRN
Status: DISCONTINUED | OUTPATIENT
Start: 2019-02-15 | End: 2019-02-15

## 2019-02-15 RX ADMIN — FENTANYL CITRATE 50 MCG: 50 INJECTION, SOLUTION INTRAMUSCULAR; INTRAVENOUS at 10:29

## 2019-02-15 RX ADMIN — DEXMEDETOMIDINE HYDROCHLORIDE 8 MCG: 100 INJECTION, SOLUTION INTRAVENOUS at 10:53

## 2019-02-15 RX ADMIN — DEXMEDETOMIDINE HYDROCHLORIDE 8 MCG: 100 INJECTION, SOLUTION INTRAVENOUS at 10:27

## 2019-02-15 RX ADMIN — CEFAZOLIN SODIUM 2 G: 2 INJECTION, SOLUTION INTRAVENOUS at 09:55

## 2019-02-15 RX ADMIN — LIDOCAINE HYDROCHLORIDE 100 MG: 20 INJECTION, SOLUTION INFILTRATION; PERINEURAL at 09:54

## 2019-02-15 RX ADMIN — SODIUM CHLORIDE, POTASSIUM CHLORIDE, SODIUM LACTATE AND CALCIUM CHLORIDE: 600; 310; 30; 20 INJECTION, SOLUTION INTRAVENOUS at 09:13

## 2019-02-15 RX ADMIN — ACETAMINOPHEN 1000 MG: 500 TABLET, FILM COATED ORAL at 09:37

## 2019-02-15 RX ADMIN — Medication 10 MG: at 10:02

## 2019-02-15 RX ADMIN — SODIUM CHLORIDE, POTASSIUM CHLORIDE, SODIUM LACTATE AND CALCIUM CHLORIDE: 600; 310; 30; 20 INJECTION, SOLUTION INTRAVENOUS at 09:35

## 2019-02-15 RX ADMIN — SODIUM CHLORIDE, POTASSIUM CHLORIDE, SODIUM LACTATE AND CALCIUM CHLORIDE: 600; 310; 30; 20 INJECTION, SOLUTION INTRAVENOUS at 11:45

## 2019-02-15 RX ADMIN — PROPOFOL 300 MG: 10 INJECTION, EMULSION INTRAVENOUS at 09:54

## 2019-02-15 RX ADMIN — FENTANYL CITRATE 50 MCG: 50 INJECTION, SOLUTION INTRAMUSCULAR; INTRAVENOUS at 10:05

## 2019-02-15 RX ADMIN — MIDAZOLAM 2 MG: 1 INJECTION INTRAMUSCULAR; INTRAVENOUS at 09:43

## 2019-02-15 RX ADMIN — Medication 5 MG: at 11:02

## 2019-02-15 RX ADMIN — HYDROCODONE BITARTRATE AND ACETAMINOPHEN 1 TABLET: 5; 325 TABLET ORAL at 11:57

## 2019-02-15 RX ADMIN — Medication 10 MG: at 10:29

## 2019-02-15 RX ADMIN — KETOROLAC TROMETHAMINE 30 MG: 30 INJECTION, SOLUTION INTRAMUSCULAR at 10:51

## 2019-02-15 RX ADMIN — FENTANYL CITRATE 50 MCG: 50 INJECTION, SOLUTION INTRAMUSCULAR; INTRAVENOUS at 09:54

## 2019-02-15 RX ADMIN — FENTANYL CITRATE 50 MCG: 50 INJECTION, SOLUTION INTRAMUSCULAR; INTRAVENOUS at 10:56

## 2019-02-15 RX ADMIN — DEXAMETHASONE SODIUM PHOSPHATE 4 MG: 4 INJECTION, SOLUTION INTRA-ARTICULAR; INTRALESIONAL; INTRAMUSCULAR; INTRAVENOUS; SOFT TISSUE at 10:09

## 2019-02-15 RX ADMIN — ONDANSETRON 4 MG: 2 INJECTION INTRAMUSCULAR; INTRAVENOUS at 10:52

## 2019-02-15 RX ADMIN — DEXMEDETOMIDINE HYDROCHLORIDE 4 MCG: 100 INJECTION, SOLUTION INTRAVENOUS at 11:08

## 2019-02-15 RX ADMIN — GLYCOPYRROLATE 0.2 MG: 0.2 INJECTION, SOLUTION INTRAMUSCULAR; INTRAVENOUS at 10:38

## 2019-02-15 ASSESSMENT — ACTIVITIES OF DAILY LIVING (ADL)
DRESS: 0-->INDEPENDENT
RETIRED_COMMUNICATION: 0-->UNDERSTANDS/COMMUNICATES WITHOUT DIFFICULTY
TOILETING: 0-->INDEPENDENT
COGNITION: 0 - NO COGNITION ISSUES REPORTED
RETIRED_EATING: 0-->INDEPENDENT
FALL_HISTORY_WITHIN_LAST_SIX_MONTHS: NO
BATHING: 0-->INDEPENDENT
TRANSFERRING: 0-->INDEPENDENT
SWALLOWING: 0-->SWALLOWS FOODS/LIQUIDS WITHOUT DIFFICULTY
AMBULATION: 0-->INDEPENDENT

## 2019-02-15 ASSESSMENT — MIFFLIN-ST. JEOR: SCORE: 2025.79

## 2019-02-15 ASSESSMENT — LIFESTYLE VARIABLES: TOBACCO_USE: 1

## 2019-02-15 ASSESSMENT — ENCOUNTER SYMPTOMS
DYSRHYTHMIAS: 0
ORTHOPNEA: 0
SEIZURES: 0

## 2019-02-15 ASSESSMENT — COPD QUESTIONNAIRES: COPD: 0

## 2019-02-15 NOTE — ANESTHESIA POSTPROCEDURE EVALUATION
Patient: Lb Ceci    Procedure(s):  REDO LEFT POPLITEAL ENTRAPMENT RELEASE, REDIVISION LEDT SOLEUS MUSCLE    Diagnosis:LEFT POPLITEAL ENTRAPMENT SYNDROME  Diagnosis Additional Information: No value filed.    Anesthesia Type:  General, LMA    Note:  Anesthesia Post Evaluation    Patient location during evaluation: PACU  Patient participation: Able to fully participate in evaluation  Level of consciousness: awake and alert  Pain management: adequate  Airway patency: patent  Cardiovascular status: acceptable and hemodynamically stable  Respiratory status: nonlabored ventilation, unassisted and acceptable  Hydration status: acceptable  PONV: none             Last vitals:  Vitals:    02/15/19 1150 02/15/19 1157 02/15/19 1200   BP: 132/73  139/69   Pulse: 99  85   Resp: 12  16   Temp: 36.8  C (98.2  F)  36.6  C (97.9  F)   SpO2: 93% 93% 93%         Electronically Signed By: Jack Saldivar MD  February 15, 2019  1:15 PM

## 2019-02-15 NOTE — ANESTHESIA PREPROCEDURE EVALUATION
Procedure: Procedure(s):  RELEASE LEFT POPLITEAL ENTRAPMENT  Preop diagnosis: POPLITEAL ENTRAPMENT SYNDROME    Allergies   Allergen Reactions     No Known Allergies      Past Medical History:   Diagnosis Date     IBS (irritable bowel syndrome)      Popliteal artery entrapment syndrome (H)      Stress fracture     HX of bilat knees     Past Surgical History:   Procedure Laterality Date     RELEASE POPLITEAL ENTRAPMENT Bilateral 7/13/2018    Procedure: RELEASE POPLITEAL ENTRAPMENT;  RIGHT AND LEFT PLANTARIS MUSCLE, TENDON, AND SOLEUS RELEASE;  Surgeon: Gordo العلي MD;  Location: [facility] OR     wisdom teeth       Social History     Tobacco Use     Smoking status: Former Smoker     Types: Cigarettes     Smokeless tobacco: Current User   Substance Use Topics     Alcohol use: Yes     Prior to Admission medications    Not on File     Current Facility-Administered Medications Ordered in Epic   Medication Dose Route Frequency Last Rate Last Dose     ceFAZolin (ANCEF) intermittent infusion 2 g in 100 mL dextrose PRE-MIX  2 g Intravenous Pre-Op/Pre-procedure x 1 dose         fentaNYL (PF) (SUBLIMAZE) injection  mcg   mcg Intravenous Pre-Op/Pre-procedure x 1 dose         lactated ringers infusion   Intravenous Continuous         lidocaine 1 % 0.1-1 mL  0.1-1 mL Other Q1H PRN         midazolam (VERSED) injection 1-2 mg  1-2 mg Intravenous Pre-Op/Pre-procedure x 1 dose         No current [facility]-ordered outpatient medications on file.       Wt Readings from Last 1 Encounters:   02/15/19 104.2 kg (229 lb 11.2 oz)     Temp Readings from Last 1 Encounters:   02/15/19 36.7  C (98.1  F) (Temporal)     BP Readings from Last 6 Encounters:   02/15/19 123/77   12/06/18 113/80   07/26/18 126/88   07/13/18 134/81   05/17/18 117/79     Pulse Readings from Last 4 Encounters:   12/06/18 86   07/26/18 78   05/17/18 112     Resp Readings from Last 1 Encounters:   02/15/19 12     SpO2 Readings from Last 1 Encounters:   No data  found for SpO2     RECENT LABS:   HGB, Plts WNL     Anesthesia Evaluation     . Pt has had prior anesthetic. Type: MAC and General    No history of anesthetic complications          ROS/MED HX    ENT/Pulmonary:     (+)tobacco use, Past use , . .   (-) asthma, COPD, sleep apnea and recent URI   Neurologic:      (-) seizures, CVA and TIA   Cardiovascular: Comment: Popliteal Artery Entrapment Syndrome        (-) angina, hypertension, CAD, orthopnea/PND, syncope, arrhythmias, irregular heartbeat/palpitations, valvular problems/murmurs and angina   METS/Exercise Tolerance:  >4 METS   Hematologic:        (-) anemia   Musculoskeletal:         GI/Hepatic:     (+) GERD (occasional - none currently) Other,      (-) liver disease   Renal/Genitourinary:      (-) renal disease   Endo:      (-) Type II DM, thyroid disease and chronic steroid usage   Psychiatric:         Infectious Disease:        (-) Recent Fever   Malignancy:         Other:                     Physical Exam  Normal systems: dental    Airway   Mallampati: II  TM distance: >3 FB  Neck ROM: full    Dental     Cardiovascular   Rhythm and rate: regular and normal  (-) no murmur    Pulmonary    breath sounds clear to auscultation(-) no rhonchi and no wheezes                        Anesthesia Plan      History & Physical Review  History and physical reviewed and following examination; no interval change.    ASA Status:  1 .    NPO Status:  > 8 hours    Plan for General and LMA with Intravenous and Propofol induction. Maintenance will be Balanced.    PONV prophylaxis:  Ondansetron (or other 5HT-3) and Dexamethasone or Solumedrol  Spoke to patient and S.O.  Regarding elevated Creatinine test in the fall.  Not addressed specifically in preop note.  Recommended following up with PCP in the fairly near future for discussion/possible recheck/etc.        Postoperative Care  Postoperative pain management:  Multi-modal analgesia.      Consents  Anesthetic plan, risks, benefits  and alternatives discussed with:  Patient..

## 2019-02-15 NOTE — BRIEF OP NOTE
[facility]    Brief Operative Note    Pre-operative diagnosis: LEFT POPLITEAL ENTRAPMENT SYNDROME  Post-operative diagnosis * No post-op diagnosis entered *  Procedure: Procedure(s):  REDO LEFT POPLITEAL ENTRAPMENT RELEASE, REDIVISION LEDT SOLEUS MUSCLE  Surgeon: Surgeon(s) and Role:     * Gordo العلي MD - Primary     * Tyron Finnegan MD - Assisting  Anesthesia: General   Estimated blood loss: * No values recorded between 2/15/2019 10:13 AM and 2/15/2019 11:21 AM *  Drains: None  Specimens: * No specimens in log *  Findings:   moderate scar tissue excised. Neurovascular bundle appears to be free.  Complications: None.  Implants: None.

## 2019-02-15 NOTE — OR NURSING
Patient Lb Ornelas is in stable condition and has met criteria for PACU discharge.  [facility] Dr. Saldivar was  informed and a sign out was given for Unit/Station transfer.

## 2019-02-15 NOTE — ANESTHESIA CARE TRANSFER NOTE
Patient: Lb Ornelas    Procedure(s):  REDO LEFT POPLITEAL ENTRAPMENT RELEASE, REDIVISION LEDT SOLEUS MUSCLE    Diagnosis: LEFT POPLITEAL ENTRAPMENT SYNDROME  Diagnosis Additional Information: No value filed.    Anesthesia Type:   General, LMA     Note:  Airway :Face Mask  Patient transferred to:PACU  Comments: At end of procedure, spontaneous respirations, adequate tidal volumes, followed commands to voice, LMA removed atraumatically, oropharynx suctioned, airway patent after LMA removal. Oxygen via facemask at 6 liters per minute to PACU. Oxygen tubing connected to wall O2 in PACU, SpO2, NiBP, and EKG monitors and alarms on and functioning, Isabela Hugger warmer connected to patient gown, report on patient's clinical status given to PACU RN, RN questions answered.Handoff Report: Identifed the Patient, Identified the Reponsible Provider, Reviewed the pertinent medical history, Discussed the surgical course, Reviewed Intra-OP anesthesia mangement and issues during anesthesia, Set expectations for post-procedure period and Allowed opportunity for questions and acknowledgement of understanding      Vitals: (Last set prior to Anesthesia Care Transfer)    CRNA VITALS  2/15/2019 1050 - 2/15/2019 1126      2/15/2019             Resp Rate (set):  10      153/74-87-16-98%-97.9f  Pt alert and following commands          Electronically Signed By: Bushra Azevedo  February 15, 2019  11:26 AM

## 2019-02-15 NOTE — DISCHARGE INSTRUCTIONS
**If you have questions or concerns about your procedure  call Dr Gordo العلي at [phone]**    Adult Discharge Orders & Instructions     For 24 hours after surgery    1. Get plenty of rest.  A responsible adult must stay with you for at least 24 hours after you leave the hospital.   2. Do not drive or use heavy equipment.  If you have weakness or tingling, don't drive or use heavy equipment until this feeling goes away.  3. Do not drink alcohol.  4. Avoid strenuous or risky activities.  Ask for help when climbing stairs.   5. You may feel lightheaded.  IF so, sit for a few minutes before standing.  Have someone help you get up.   6. If you have nausea (feel sick to your stomach): Drink only clear liquids such as apple juice, ginger ale, broth or 7-Up.  Rest may also help.  Be sure to drink enough fluids.  Move to a regular diet as you feel able.  7. You may have a slight fever. Call the doctor if your fever is over 100 F (37.7 C) (taken under the tongue) or lasts longer than 24 hours.  8. You may have a dry mouth, a sore throat, muscle aches or trouble sleeping.  These should go away after 24 hours.  9. Do not make important or legal decisions.   Call your doctor for any of the followin.  Signs of infection (fever, growing tenderness at the surgery site, a large amount of drainage or bleeding, severe pain, foul-smelling drainage, redness, swelling).    2. It has been over 8 to 10 hours since surgery and you are still not able to urinate (pass water).    3.  Headache for over 24 hours.    4.  Numbness, tingling or weakness the day after surgery (if you had spinal anesthesia).  To contact a doctor, call ________________________________________

## 2019-02-15 NOTE — PROGRESS NOTES
Admission medication history interview status for the 2/15/2019  admission is complete. See EPIC admission navigator for prior to admission medications     Medication history source reliability:Good    Medication history interview source(s):Patient    Medication history resources (including written lists, pill bottles, clinic record):None    Primary pharmacy.N/A    Additional medication history information not noted on PTA med list :None    Time spent in this activity: 30 minutes    Prior to Admission medications    Not on File

## 2019-02-15 NOTE — OP NOTE
Procedure Date: 02/15/2019      PREOPERATIVE DIAGNOSIS:  Recurrent symptomatic left leg, secondary to popliteal artery entrapment syndrome.      POSTOPERATIVE DIAGNOSIS:  Recurrent symptomatic left leg, secondary to popliteal artery entrapment syndrome.      OPERATIVE PROCEDURE:     1.  Re-exploration of left popliteal area.   a.  Mobilization and further division of soleus fascia and muscle with excision of scar tissue.   b.  Mobilization of left neurovascular structures.      SURGEON:  Gordo العلي MD.      :  Tyron Cee ([facility] surgery resident).      ANESTHESIA:  General - LMA.      MEDICATIONS:  Ancef 2 grams IV, Tylenol 1000 mg p.o.      INDICATION:  This 38-year-old patient had bilateral disabling popliteal artery entrapment syndrome, preventing him from running.  He underwent bilateral excision of the plantaris muscle and tendon and division of the soleus proximal fascia and muscle in 07/2018.  He has had significant improvement in his right leg but still some ongoing issues with the left leg.  Followup ultrasound revealed significant compression of the left distal below-knee popliteal artery with plantar flexion, with the diameter going from 6.3 to 1.7 mL but normal on dorsiflexion.  No significant venous compression was noted.  Ankle-brachial index on the left decreased from 1.11 at rest to 0.84 with exercise with calf symptoms associated with this.  Felt he most likely had scar tissue and that re-exploration was indicated under informed consent of the patient and his wife.      PROCEDURE:  Patient was brought to the operating room induced under general anesthesia, LMA was placed.  Entire left leg and calf were prepped and draped in sterile fashion.  Timeout was called and the sites were identified.      Re-exploration of popliteal fossa:  We excised the previous scar tissue from our medial incision.  Dissection with  electrocautery to the fascia, which was divided vertically.  Medial  head of the gastrocnemius muscle had no scar tissue and was retracted posteriorly.  We identified the soleus muscle, which had not been divided for a length of approximately 6 cm previously.  There was an overlying incorporated sheath of smooth reactive tissue around this from the division site.  As we dissected towards the neurovascular structures, there was some scar tissue appreciated from the more posterolateral attachments of the divided soleus muscle but did not appear to be excessive.      Mobilization of neurovascular structures:  We initially started at the mid-popliteal artery.  This was dissected free identifying the tibial nerve and popliteal artery and vein.  We tried to avoid any excessive manipulation or dissection to help prevent recurrent scar tissue.  We made sure that there were no encroaching tissue bands that could cause any type of compression.  We then worked distally.  We identified the distal popliteal artery and the anterior tibial artery and anterior tibial vein and freed these up to make sure again there were no adhesions.  We then further divided the soleus fascia and muscle more distally for another 2 cm to make sure that again there was no encroachment.  Absolute hemostasis was noted.  We ensured that any loose fascia or muscle that had been mobilized was excised to help prevent recurring adhesions.      Upon doing so, we had excellent visualization of the tibial nerve, well into the deep posterior compartment, along with the popliteal artery and the trifurcation vessels, though we tried to avoid any excessive dissection.  Good visualization of the popliteal venous structures also noted.  One small crossing vein was divided between 4-0 silk suture to make sure that this would not encroach upon the neurovascular structures.      With his foot in dorsi- and plantar flexion, we saw no evidence at all of compression in the neurovascular structures.      Small amount of Seprafilm was placed  over the divided soleus muscle and fascia and also laid over the neurovascular structures, including the dissection where we followed anteriorly the anterior tibial artery and vein to help prevent future scar tissue.      The superficial fascia was closed with running 3-0 Vicryl.  Subcutaneous tissue was closed with interrupted 3-0 Vicryl, and the skin was closed with 4-0 Monocryl in subcuticular fashion.  Wounds were infiltrated with 0.5% Marcaine for postoperative analgesia, along with Toradol 30 mg IV.  Steri-Strips, gauze, Keyonna rolls applied, followed by a thigh-high NICOLÁS stocking.      Patient tolerated procedure well.        ESTIMATED BLOOD LOSS:  Less than 5 mL.      COMPLICATIONS:  None.      OPERATIVE FINDINGS:  We did see some mild scar tissue in the more distal portion of the popliteal artery that was completely excised and mobilized.  No residual scar tissue or encroachment appreciated.         GORDO DENISE MD             D: 02/15/2019   T: 02/15/2019   MT: PM      Name:     YULISA ELLER   MRN:      [id number]        Account:        [id number]   :      1980           Procedure Date: 02/15/2019      Document: [id number]       cc: Gordo Denise MD

## 2019-02-27 NOTE — PROGRESS NOTES
[place] [facility]    Lb Ornelas has a history of a left popliteal artery entrapment syndrome undergoing surgery gave him only mild improvement of his symptoms.  He had evidence of recurrent scar tissue and underwent reexploration of the left popliteal region on 2/15/2019.  We mobilized and further divided the soleus fascia/ muscle excising scar tissue with mobilization of the otherwise normal neurovascular structures.    He feels that he is doing well.  He seems to be less sore than his original surgery.  He is trying to gradually increase his activities.    Exam: Alert and appropriate.  Blood pressure 133/85.             Healing left proximal medial calf incision.  No swelling.             CMS= normal    Impression: Patient is doing very well following reduced left popliteal entrapment surgery due to scar tissue.  He will gradually increase his activities.  He is in [employer] and needs to do multiple activities.  We went over his worksheet today.  His restrictions are related to running-walking and swimming along with riding a bicycle.  He may do these activities as his own pace as tolerated.  I suspect this will continue to improve.  Plan to keep him on these restrictions until he is reevaluated with me in 3 months.  He will call if he has any concerns prior to this.    Striction form was copied with the original form going with him so he can turn it into his commanding officer.      Gordo العلي MD

## 2019-02-28 ENCOUNTER — OFFICE VISIT (OUTPATIENT)
Dept: OTHER | Facility: CLINIC | Age: 39
End: 2019-02-28
Attending: SURGERY
Payer: OTHER GOVERNMENT

## 2019-02-28 VITALS
BODY MASS INDEX: 30.35 KG/M2 | HEART RATE: 101 BPM | RESPIRATION RATE: 18 BRPM | WEIGHT: 229 LBS | SYSTOLIC BLOOD PRESSURE: 133 MMHG | OXYGEN SATURATION: 95 % | DIASTOLIC BLOOD PRESSURE: 85 MMHG | HEIGHT: 73 IN

## 2019-02-28 DIAGNOSIS — I77.89 POPLITEAL ARTERY ENTRAPMENT SYNDROME (H): Primary | ICD-10-CM

## 2019-02-28 PROCEDURE — G0463 HOSPITAL OUTPT CLINIC VISIT: HCPCS

## 2019-02-28 PROCEDURE — 99024 POSTOP FOLLOW-UP VISIT: CPT | Mod: ZP | Performed by: SURGERY

## 2019-02-28 ASSESSMENT — MIFFLIN-ST. JEOR: SCORE: 2022.62

## 2019-02-28 NOTE — LETTER
[facility] [address]  [place] [address]  Phone: [phone]  Fax: [phone]        2019       Re: Lb Ornelas - 1980    Lb Ornelas has a history of a left popliteal artery entrapment syndrome undergoing surgery gave him only mild improvement of his symptoms.  He had evidence of recurrent scar tissue and underwent reexploration of the left popliteal region on 2/15/2019.  We mobilized and further divided the soleus fascia/ muscle excising scar tissue with mobilization of the otherwise normal neurovascular structures.     He feels that he is doing well.  He seems to be less sore than his original surgery.  He is trying to gradually increase his activities.     Exam: Alert and appropriate.  Blood pressure 133/85.             Healing left proximal medial calf incision.  No swelling.             CMS= normal     Impression: Patient is doing very well following reduced left popliteal entrapment surgery due to scar tissue.  He will gradually increase his activities.  He is in [employer] and needs to do multiple activities.  We went over his worksheet today.  His restrictions are related to running-walking and swimming along with riding a bicycle.  He may do these activities as his own pace as tolerated.  I suspect this will continue to improve.  Plan to keep him on these restrictions until he is reevaluated with me in 3 months.  He will call if he has any concerns prior to this.     Striction form was copied with the original form going with him so he can turn it into his commanding officer.       Gordo العلي MD

## 2020-03-11 ENCOUNTER — HEALTH MAINTENANCE LETTER (OUTPATIENT)
Age: 40
End: 2020-03-11

## 2021-01-03 ENCOUNTER — HEALTH MAINTENANCE LETTER (OUTPATIENT)
Age: 41
End: 2021-01-03

## 2021-04-25 ENCOUNTER — HEALTH MAINTENANCE LETTER (OUTPATIENT)
Age: 41
End: 2021-04-25

## 2021-10-10 ENCOUNTER — HEALTH MAINTENANCE LETTER (OUTPATIENT)
Age: 41
End: 2021-10-10

## 2022-05-21 ENCOUNTER — HEALTH MAINTENANCE LETTER (OUTPATIENT)
Age: 42
End: 2022-05-21

## 2022-09-18 ENCOUNTER — HEALTH MAINTENANCE LETTER (OUTPATIENT)
Age: 42
End: 2022-09-18

## 2023-06-04 ENCOUNTER — HEALTH MAINTENANCE LETTER (OUTPATIENT)
Age: 43
End: 2023-06-04

## (undated) DEVICE — BARRIER SEPRAFILM 5X6" SINGLE SHEET 4301-02

## (undated) DEVICE — PREP CHLORAPREP 26ML TINTED ORANGE  260815

## (undated) DEVICE — SOL WATER IRRIG 1000ML BOTTLE 2F7114

## (undated) DEVICE — BLADE KNIFE SURG 10 371110

## (undated) DEVICE — COVER SHOE STERILE

## (undated) DEVICE — DRSG STERI STRIP 1/2X4" R1547

## (undated) DEVICE — LINEN LEG ROLL 5489

## (undated) DEVICE — BNDG ROLLER GAUZE CONFORM 4"X4YD 41-54

## (undated) DEVICE — SU MONOCRYL 4-0 PS-2 18" UND Y496G

## (undated) DEVICE — SYR BULB IRRIG 50ML LATEX FREE 0035280

## (undated) DEVICE — SU SILK 3-0 TIE 24" SA74H

## (undated) DEVICE — LINEN TOWEL PACK X5 5464

## (undated) DEVICE — ESU GROUND PAD UNIVERSAL W/O CORD

## (undated) DEVICE — ESU ELEC BLADE 4" COATED

## (undated) DEVICE — GLOVE PROTEXIS W/NEU-THERA 7.5  2D73TE75

## (undated) DEVICE — SU VICRYL 3-0 SH 27" J316H

## (undated) DEVICE — GOWN IMPERVIOUS ZONED LG

## (undated) DEVICE — DRAPE EXTREMITY BILAT

## (undated) DEVICE — SOL NACL 0.9% IRRIG 1000ML BOTTLE 07138-09

## (undated) DEVICE — SUCTION CANISTER MEDIVAC LINER 3000ML W/LID 65651-530

## (undated) DEVICE — PACK EXTREMITY SOP15EXFSD

## (undated) DEVICE — SU SILK 4-0 TIE 12X30" A303H

## (undated) DEVICE — PACK MINOR SBA15MIFSE

## (undated) DEVICE — DRAPE EXTREMITY W/ARMBOARD 29405

## (undated) DEVICE — CLIP ETHICON LIGACLIP SM BLUE LT100

## (undated) DEVICE — SU MONOCRYL 4-0 P-3 18" UND Y494G

## (undated) RX ORDER — ONDANSETRON 2 MG/ML
INJECTION INTRAMUSCULAR; INTRAVENOUS
Status: DISPENSED
Start: 2019-02-15

## (undated) RX ORDER — LIDOCAINE HYDROCHLORIDE 20 MG/ML
INJECTION, SOLUTION EPIDURAL; INFILTRATION; INTRACAUDAL; PERINEURAL
Status: DISPENSED
Start: 2019-02-15

## (undated) RX ORDER — PROPOFOL 10 MG/ML
INJECTION, EMULSION INTRAVENOUS
Status: DISPENSED
Start: 2018-07-13

## (undated) RX ORDER — HYDROCODONE BITARTRATE AND ACETAMINOPHEN 5; 325 MG/1; MG/1
TABLET ORAL
Status: DISPENSED
Start: 2019-02-15

## (undated) RX ORDER — FENTANYL CITRATE 50 UG/ML
INJECTION, SOLUTION INTRAMUSCULAR; INTRAVENOUS
Status: DISPENSED
Start: 2019-02-15

## (undated) RX ORDER — ONDANSETRON 2 MG/ML
INJECTION INTRAMUSCULAR; INTRAVENOUS
Status: DISPENSED
Start: 2018-07-13

## (undated) RX ORDER — BUPIVACAINE HYDROCHLORIDE 5 MG/ML
INJECTION, SOLUTION EPIDURAL; INTRACAUDAL
Status: DISPENSED
Start: 2019-02-15

## (undated) RX ORDER — CEFAZOLIN SODIUM 2 G/100ML
INJECTION, SOLUTION INTRAVENOUS
Status: DISPENSED
Start: 2019-02-15

## (undated) RX ORDER — DEXAMETHASONE SODIUM PHOSPHATE 4 MG/ML
INJECTION, SOLUTION INTRA-ARTICULAR; INTRALESIONAL; INTRAMUSCULAR; INTRAVENOUS; SOFT TISSUE
Status: DISPENSED
Start: 2018-07-13

## (undated) RX ORDER — FENTANYL CITRATE 50 UG/ML
INJECTION, SOLUTION INTRAMUSCULAR; INTRAVENOUS
Status: DISPENSED
Start: 2018-07-13

## (undated) RX ORDER — KETOROLAC TROMETHAMINE 30 MG/ML
INJECTION, SOLUTION INTRAMUSCULAR; INTRAVENOUS
Status: DISPENSED
Start: 2018-07-13

## (undated) RX ORDER — CEFAZOLIN SODIUM 2 G/100ML
INJECTION, SOLUTION INTRAVENOUS
Status: DISPENSED
Start: 2018-07-13

## (undated) RX ORDER — LIDOCAINE HYDROCHLORIDE 20 MG/ML
INJECTION, SOLUTION EPIDURAL; INFILTRATION; INTRACAUDAL; PERINEURAL
Status: DISPENSED
Start: 2018-07-13

## (undated) RX ORDER — OXYCODONE AND ACETAMINOPHEN 5; 325 MG/1; MG/1
TABLET ORAL
Status: DISPENSED
Start: 2018-07-13

## (undated) RX ORDER — GLYCOPYRROLATE 0.2 MG/ML
INJECTION, SOLUTION INTRAMUSCULAR; INTRAVENOUS
Status: DISPENSED
Start: 2019-02-15

## (undated) RX ORDER — DEXAMETHASONE SODIUM PHOSPHATE 4 MG/ML
INJECTION, SOLUTION INTRA-ARTICULAR; INTRALESIONAL; INTRAMUSCULAR; INTRAVENOUS; SOFT TISSUE
Status: DISPENSED
Start: 2019-02-15

## (undated) RX ORDER — PROPOFOL 10 MG/ML
INJECTION, EMULSION INTRAVENOUS
Status: DISPENSED
Start: 2019-02-15

## (undated) RX ORDER — BUPIVACAINE HYDROCHLORIDE 5 MG/ML
INJECTION, SOLUTION EPIDURAL; INTRACAUDAL
Status: DISPENSED
Start: 2018-07-13

## (undated) RX ORDER — ACETAMINOPHEN 500 MG
TABLET ORAL
Status: DISPENSED
Start: 2019-02-15

## (undated) RX ORDER — ACETAMINOPHEN 500 MG
TABLET ORAL
Status: DISPENSED
Start: 2018-07-13